# Patient Record
Sex: FEMALE | Race: WHITE | NOT HISPANIC OR LATINO | Employment: UNEMPLOYED | URBAN - METROPOLITAN AREA
[De-identification: names, ages, dates, MRNs, and addresses within clinical notes are randomized per-mention and may not be internally consistent; named-entity substitution may affect disease eponyms.]

---

## 2018-05-14 ENCOUNTER — OFFICE VISIT CONVERTED (OUTPATIENT)
Dept: ONCOLOGY | Facility: HOSPITAL | Age: 23
End: 2018-05-14
Attending: INTERNAL MEDICINE

## 2018-06-04 ENCOUNTER — OFFICE VISIT CONVERTED (OUTPATIENT)
Dept: ONCOLOGY | Facility: HOSPITAL | Age: 23
End: 2018-06-04
Attending: INTERNAL MEDICINE

## 2018-07-02 ENCOUNTER — OFFICE VISIT CONVERTED (OUTPATIENT)
Dept: ONCOLOGY | Facility: HOSPITAL | Age: 23
End: 2018-07-02
Attending: INTERNAL MEDICINE

## 2018-12-05 ENCOUNTER — OFFICE VISIT CONVERTED (OUTPATIENT)
Dept: SURGERY | Facility: CLINIC | Age: 23
End: 2018-12-05
Attending: NURSE PRACTITIONER

## 2019-01-07 ENCOUNTER — HOSPITAL ENCOUNTER (OUTPATIENT)
Dept: ONCOLOGY | Facility: HOSPITAL | Age: 24
Discharge: HOME | End: 2019-01-07
Attending: INTERNAL MEDICINE

## 2019-01-07 ENCOUNTER — OFFICE VISIT CONVERTED (OUTPATIENT)
Dept: ONCOLOGY | Facility: HOSPITAL | Age: 24
End: 2019-01-07
Attending: INTERNAL MEDICINE

## 2019-01-07 LAB
ALBUMIN SERPL-MCNC: 4.6 G/DL (ref 3.5–5)
ALBUMIN/GLOB SERPL: 1.6 {RATIO} (ref 1.4–2.6)
ALP SERPL-CCNC: 65 U/L (ref 42–98)
ALT SERPL-CCNC: 14 U/L (ref 10–40)
ANION GAP SERPL CALC-SCNC: 14 MMOL/L (ref 8–19)
AST SERPL-CCNC: 25 U/L (ref 15–50)
BASOPHILS # BLD AUTO: 0.01 10*3/UL (ref 0–0.2)
BASOPHILS NFR BLD AUTO: 0.1 % (ref 0–3)
BILIRUB SERPL-MCNC: 0.22 MG/DL (ref 0.2–1.3)
BUN SERPL-MCNC: 7 MG/DL (ref 5–25)
BUN/CREAT SERPL: 9 {RATIO} (ref 6–20)
CALCIUM SERPL-MCNC: 9.3 MG/DL (ref 8.7–10.4)
CHLORIDE SERPL-SCNC: 101 MMOL/L (ref 99–111)
CONV CO2: 25 MMOL/L (ref 22–32)
CONV TOTAL PROTEIN: 7.5 G/DL (ref 6.3–8.2)
CREAT UR-MCNC: 0.82 MG/DL (ref 0.5–0.9)
EOSINOPHIL # BLD AUTO: 0.05 10*3/UL (ref 0–0.7)
EOSINOPHIL # BLD AUTO: 1.07 % (ref 0–7)
ERYTHROCYTE [DISTWIDTH] IN BLOOD BY AUTOMATED COUNT: 12.4 % (ref 11.5–14.5)
GFR SERPLBLD BASED ON 1.73 SQ M-ARVRAT: >60 ML/MIN/{1.73_M2}
GLOBULIN UR ELPH-MCNC: 2.9 G/DL (ref 2–3.5)
GLUCOSE SERPL-MCNC: 127 MG/DL (ref 65–99)
HBA1C MFR BLD: 12.4 G/DL (ref 12–16)
HCT VFR BLD AUTO: 35.4 % (ref 37–47)
LYMPHOCYTES # BLD AUTO: 1.48 10*3/UL (ref 1–5)
MCH RBC QN AUTO: 28.8 PG (ref 27–31)
MCHC RBC AUTO-ENTMCNC: 35 G/DL (ref 33–37)
MCV RBC AUTO: 82.3 FL (ref 81–99)
MONOCYTES # BLD AUTO: 0.24 10*3/UL (ref 0.2–1.2)
MONOCYTES NFR BLD AUTO: 4.74 % (ref 3–10)
NEUTROPHILS # BLD AUTO: 3.31 10*3/UL (ref 2–8)
NEUTROPHILS NFR BLD AUTO: 65 % (ref 30–85)
NRBC BLD AUTO-RTO: 0 % (ref 0–0.01)
OSMOLALITY SERPL CALC.SUM OF ELEC: 284 MOSM/KG (ref 273–304)
PLATELET # BLD AUTO: 130 10*3/UL (ref 130–400)
PMV BLD AUTO: 9.4 FL (ref 7.4–10.4)
POTASSIUM SERPL-SCNC: 3.4 MMOL/L (ref 3.5–5.3)
RBC # BLD AUTO: 4.3 10*6/UL (ref 4.2–5.4)
SODIUM SERPL-SCNC: 137 MMOL/L (ref 135–147)
VARIANT LYMPHS NFR BLD MANUAL: 29.1 % (ref 20–45)
WBC # BLD AUTO: 5.09 10*3/UL (ref 4.8–10.8)

## 2019-01-29 ENCOUNTER — OFFICE VISIT CONVERTED (OUTPATIENT)
Dept: GASTROENTEROLOGY | Facility: CLINIC | Age: 24
End: 2019-01-29
Attending: NURSE PRACTITIONER

## 2019-01-31 ENCOUNTER — HOSPITAL ENCOUNTER (OUTPATIENT)
Dept: GASTROENTEROLOGY | Facility: HOSPITAL | Age: 24
Setting detail: HOSPITAL OUTPATIENT SURGERY
Discharge: HOME | End: 2019-01-31
Attending: INTERNAL MEDICINE

## 2019-01-31 LAB — HCG UR QL: NEGATIVE

## 2019-02-19 ENCOUNTER — OFFICE VISIT CONVERTED (OUTPATIENT)
Dept: GASTROENTEROLOGY | Facility: CLINIC | Age: 24
End: 2019-02-19
Attending: NURSE PRACTITIONER

## 2019-02-23 ENCOUNTER — HOSPITAL ENCOUNTER (OUTPATIENT)
Dept: MRI IMAGING | Facility: HOSPITAL | Age: 24
Discharge: HOME | End: 2019-02-23
Attending: NURSE PRACTITIONER

## 2019-02-27 ENCOUNTER — HOSPITAL ENCOUNTER (OUTPATIENT)
Dept: URGENT CARE | Facility: CLINIC | Age: 24
Discharge: HOME | End: 2019-02-27
Attending: FAMILY MEDICINE

## 2019-03-01 LAB — BACTERIA SPEC AEROBE CULT: NORMAL

## 2019-03-09 ENCOUNTER — HOSPITAL ENCOUNTER (OUTPATIENT)
Dept: OTHER | Facility: HOSPITAL | Age: 24
Discharge: HOME | End: 2019-03-09
Attending: INTERNAL MEDICINE

## 2019-03-09 LAB
CHOLEST SERPL-MCNC: 149 MG/DL (ref 107–200)
CHOLEST/HDLC SERPL: 2.9 {RATIO} (ref 3–6)
EST. AVERAGE GLUCOSE BLD GHB EST-MCNC: 163 MG/DL
FERRITIN SERPL-MCNC: 271 NG/ML (ref 10–200)
HBA1C MFR BLD: 7.3 % (ref 3.5–5.7)
HDLC SERPL-MCNC: 51 MG/DL (ref 40–60)
IRON SATN MFR SERPL: 17 % (ref 20–55)
IRON SERPL-MCNC: 61 UG/DL (ref 60–170)
LDLC SERPL CALC-MCNC: 74 MG/DL (ref 70–100)
TIBC SERPL-MCNC: 368 UG/DL (ref 245–450)
TRANSFERRIN SERPL-MCNC: 257 MG/DL (ref 250–380)
TRIGL SERPL-MCNC: 118 MG/DL (ref 40–150)
TSH SERPL-ACNC: 1.26 M[IU]/L (ref 0.27–4.2)
VLDLC SERPL-MCNC: 24 MG/DL (ref 5–37)

## 2019-03-12 LAB
CERULOPLASMIN SERPL-MCNC: 22.1 MG/DL (ref 19–39)
CONV ANTI NUCLEAR ANTIBODY WITH REFLEX: NEGATIVE
CONV HEPATITIS B SURFACE AG W CONFIRMATION RE: NEGATIVE
CONV IMMUNOGLOBULIN G (IGG): 1078 MG/DL (ref 700–1600)
CONV IMMUNOGLOBULIN M (IGM): 425 MG/DL (ref 26–217)
DEPRECATED MITOCHONDRIA M2 IGG SER-ACNC: <20 UNITS (ref 0–20)
HAV IGM SERPL QL IA: NEGATIVE
HBV CORE IGM SERPL QL IA: NEGATIVE
HCV AB SER DONR QL: <0.1 S/CO RATIO (ref 0–0.9)
IGA SERPL-MCNC: 352 MG/DL (ref 87–352)

## 2019-03-13 LAB — SMOOTH MUSCLE F-ACTIN AB IGG: 11 UNITS (ref 0–19)

## 2019-03-15 LAB
A1AT SERPL-MCNC: 139 MG/DL (ref 90–200)
PHENOTYPE: NORMAL

## 2019-05-01 ENCOUNTER — HOSPITAL ENCOUNTER (OUTPATIENT)
Dept: OTHER | Facility: HOSPITAL | Age: 24
Discharge: HOME | End: 2019-05-01

## 2019-05-03 LAB
CONV CREATININE (CRT) (URINE): 0.69 G/L (ref 0.3–3)
CONV TOTAL VOLUME (URINE): 1850 ML
COPPER 24H UR-MRATE: 15 UG/24 HR (ref 3–35)
COPPER UR-MCNC: 12 UG/G CREAT (ref 0–49)
COPPER UR-MCNC: 8 UG/L

## 2019-10-15 ENCOUNTER — OFFICE VISIT CONVERTED (OUTPATIENT)
Dept: GASTROENTEROLOGY | Facility: CLINIC | Age: 24
End: 2019-10-15
Attending: NURSE PRACTITIONER

## 2019-11-20 ENCOUNTER — HOSPITAL ENCOUNTER (OUTPATIENT)
Dept: URGENT CARE | Facility: CLINIC | Age: 24
Discharge: HOME | End: 2019-11-20
Attending: PHYSICIAN ASSISTANT

## 2020-02-27 ENCOUNTER — OFFICE VISIT CONVERTED (OUTPATIENT)
Dept: NEUROLOGY | Facility: CLINIC | Age: 25
End: 2020-02-27
Attending: NURSE PRACTITIONER

## 2020-09-18 ENCOUNTER — HOSPITAL ENCOUNTER (OUTPATIENT)
Dept: GENERAL RADIOLOGY | Facility: HOSPITAL | Age: 25
Discharge: HOME | End: 2020-09-18
Attending: NURSE PRACTITIONER

## 2020-10-15 LAB
CREAT BLD-MCNC: 0.8 MG/DL (ref 0.6–1.4)
GFR SERPLBLD BASED ON 1.73 SQ M-ARVRAT: >60 ML/MIN/{1.73_M2}

## 2020-12-01 ENCOUNTER — HOSPITAL ENCOUNTER (OUTPATIENT)
Dept: GENERAL RADIOLOGY | Facility: HOSPITAL | Age: 25
Discharge: HOME | End: 2020-12-01
Attending: OBSTETRICS & GYNECOLOGY

## 2021-01-18 ENCOUNTER — HOSPITAL ENCOUNTER (OUTPATIENT)
Dept: OTHER | Facility: HOSPITAL | Age: 26
Discharge: HOME | End: 2021-01-18
Attending: NURSE PRACTITIONER

## 2021-01-18 ENCOUNTER — OFFICE VISIT CONVERTED (OUTPATIENT)
Dept: GASTROENTEROLOGY | Facility: CLINIC | Age: 26
End: 2021-01-18
Attending: NURSE PRACTITIONER

## 2021-01-27 LAB
PBG 24H UR-MRATE: 0.4 MG/L (ref 0–2)
PBG 24H UR-MRATE: 0.6 MG/24 HR (ref 0–1.5)
URINE VOLUME: 1500 ML

## 2021-01-28 LAB
COPRO1 24H UR-MRATE: 11 UG/L
COPRO1 24H UR-MRATE: 17 UG/24 HR (ref 0–24)
COPRO3 24H UR-MRATE: 27 UG/L
COPRO3 24H UR-MRATE: 41 UG/24 HR (ref 0–74)
HEPTA-CP 24H UR-SRATE: 2 UG/L
HEPTA-CP 24H UR-SRATE: 3 UG/24 HR (ref 0–4)
HEXA-CP 24H UR-SRATE: <1 UG/L
HEXACARBOXYLPORPHYRIN [PRESENCE] IN 24 HOUR URINE: <2 UG/24 HR (ref 0–1)
Lab: <1 UG/L
PENTA-CP 24H UR-SRATE: <2 UG/24 HR (ref 0–4)
TOTAL VOLUME: 1500 ML
UROPOR 24H UR QL: 20 UG/24 HR (ref 0–24)
UROPOR UR-MCNC: 13 UG/L

## 2021-01-29 ENCOUNTER — HOSPITAL ENCOUNTER (OUTPATIENT)
Dept: OTHER | Facility: HOSPITAL | Age: 26
Discharge: HOME | End: 2021-01-29
Attending: NURSE PRACTITIONER

## 2021-02-03 LAB
D-ALA 24H UR-MRATE: 1.3 MG/L
Lab: 2 MG/24 HR (ref 0.5–5.1)
URINE VOLUME: 1500 ML

## 2021-03-18 ENCOUNTER — TELEMEDICINE CONVERTED (OUTPATIENT)
Dept: GASTROENTEROLOGY | Facility: CLINIC | Age: 26
End: 2021-03-18
Attending: NURSE PRACTITIONER

## 2021-05-14 VITALS
DIASTOLIC BLOOD PRESSURE: 75 MMHG | HEART RATE: 76 BPM | HEIGHT: 62 IN | WEIGHT: 157.12 LBS | BODY MASS INDEX: 28.91 KG/M2 | TEMPERATURE: 96.7 F | SYSTOLIC BLOOD PRESSURE: 110 MMHG

## 2021-05-14 VITALS — WEIGHT: 150 LBS

## 2021-05-14 NOTE — PROGRESS NOTES
Progress Note      Patient Name: Yamilet Dempsey   Patient ID: [id number]   Sex: Female   YOB: 1995    Primary Care Provider: Katiuska Cross PA-C   Referring Provider: Feroz Ashley MD    Visit Date: 2021    Provider: KARI Ly   Location: [facility]   Location Address: [address] [place] [address], [address] [place] [address]  [id number]   Location Phone: [phone]          Chief Complaint  · Follow up Abdominal pain       History Of Present Illness  Video Conferencing Visit  Yamilet Dempsey is a 26 year old /White female who is presenting for evaluation via video conferencing via zoom. Verbal consent obtained before beginning visit.   The following staff were present during this visit: Migdalia Zheng MA      Ms. Dempsey presents for follow-up of abdominal pain and hepatosplenomegaly.  2021 urine porphobiinogen - normal. Urine porphyrins - normal.      She is continuing to experience abdominal discomfort that she describes as a fullness in her abdomen.  She feels that this is due to the increased size of her liver.      Reports that her other sister is also having similar issues.  They are all concerned since their mother had liver cancer.  She has been evaluated by hematology and hepatology and didn't feel that either were able to provider her with any answers.                  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; ; Tobacco (Never)          Review of Systems  · Constitutional  o Denies  o : chills, fever  · Cardiovascular  o Denies  o : chest pain, irregular heart beats  · Respiratory  o Denies  o : cough, shortness of breath  · Gastrointestinal  o Admits  o : see HPI   · Endocrine  o Denies  o : weight gain, weight loss      Vitals  Date Time BP Position Site L\R Cuff Size HR RR TEMP (F) WT  HT  BMI kg/m2 BSA m2 O2 Sat FR L/min FiO2 HC       03/18/2021 09:02 AM         150lbs 0oz                Physical Examination  · Constitutional  o Appearance  o : Healthy-appearing, awake and alert in no acute distress  · Head and Face  o Head  o : Normocephalic with no worriesome skin lesions  · Eyes  o Vision  o :   § Visual Fields  § : eyes move symmetrical in all directions  o Sclerae  o : sclerae anicteric  o Pupils and Irises  o : pupils equal and symmetrical  · Neck  o Inspection/Palpation  o : normal appearance, trachea midline  · Respiratory  o Respiratory Effort  o : Breathing is unlabored.  o Inspection of Chest  o : normal appearance  · Skin and Subcutaneous Tissue  o General Inspection  o : no lesions present, no areas of discoloration  · Psychiatric  o General  o : Alert and oriented x3  o Mood and Affect  o : Mood and affect are appropriate to circumstances          Assessment  · Abdominal Pain, Generalized     789.07/R10.84  · Hepatosplenomegaly     571.8/R16.2      Plan  · Medications  o Medications have been Reconciled  · Instructions  o Information given on current diagnoses. Discussed with patient that all workup has been unrevealing from a GI standpoint. Would recommend further w/u at higher level of care such as [facility].  · Disposition  o Follow up PRN - Call if any change in bowel pattern, abdominal pain, rectal bleeding, or any new GI complaint.            Electronically Signed by: Gabriela Stiles APRN -Author on March 19, 2021 02:21:07 PM

## 2021-05-14 NOTE — PROGRESS NOTES
Progress Note      Patient Name: Yamilet Dempsey   Patient ID: [id number]   Sex: Female   YOB: 1995    Primary Care Provider: Katiuska Cross PA-C   Referring Provider: Feroz Ashley MD    Visit Date: 2021    Provider: KARI Ly   Location: [facility]   Location Address: [address] [place] [address], [address] [place] [address]  [id number]   Location Phone: [phone]          Chief Complaint  · Follow up Abdominal pain       History Of Present Illness     Ms. Dempsey presents for follow-up of right upper quadrant abdominal pain, thrombocytopenia and hepatosplenomegaly.    She was last evaluated in 10/2019 and had been referred to both hematology and hepatology.  She underwent liver biopsy at  and per patient, it was unrevealing.  Hematology w/u was also normal.      She reports that pain has worsened over the last few weeks.  Reports that pain is still present and is right under ribs on both left and right side.  Reports that pain is worse after meals, especially if she drinks carbonated drinks.  Hasn't noticed any correlation with high fat foods.  Takes Tylenol some, but it's not very helpful.      Reports alternating between constipation and diarrhea.  Denies rectal bleeding.      2020 CT abdomen pelvis with contrast-hepatosplenomegaly and small involuting enhancing cyst of the left ovary.       Past Medical History  Diabetes; Diabetes mellitus, insulin dependent (IDDM), controlled; Gastric Ulcer         Past Surgical History   section; Endoscopy; Liver biopsy         Medication List  Humalog U-100 Insulin 100 unit/mL subcutaneous cartridge; Prenatal oral         Allergy List  NO KNOWN DRUG ALLERGIES       Allergies Reconciled  Family Medical History  Diabetes, unspecified type; Family history of breast cancer; Family history of cancer         Social History  Alcohol (Never); Caffeine (Current - status unknown); Homemaker; lives  "with children; ; Tobacco (Never)         Review of Systems  · Constitutional  o Denies  o : chills, fever  · Cardiovascular  o Denies  o : chest pain, irregular heart beats  · Respiratory  o Denies  o : cough, shortness of breath  · Gastrointestinal  o Admits  o : see HPI   · Endocrine  o Denies  o : weight gain, weight loss      Vitals  Date Time BP Position Site L\R Cuff Size HR RR TEMP (F) WT  HT  BMI kg/m2 BSA m2 O2 Sat FR L/min FiO2 HC       01/18/2021 09:29 /75 Sitting    76 - R  96.7 157lbs 2oz 5'  2\" 28.74 1.77             Physical Examination  · Constitutional  o Appearance  o : Healthy-appearing, awake and alert in no acute distress  · Head and Face  o Head  o : Normocephalic with no worriesome skin lesions  · Eyes  o Vision  o :   § Visual Fields  § : eyes move symmetrical in all directions  o Sclerae  o : sclerae anicteric  o Pupils and Irises  o : pupils equal and symmetrical  · Neck  o Inspection/Palpation  o : Trachea is midline, no adenopathy  · Respiratory  o Respiratory Effort  o : Breathing is unlabored.  o Inspection of Chest  o : normal appearance  o Auscultation of Lungs  o : Chest is clear to auscultation bilaterally.  · Cardiovascular  o Heart  o :   § Auscultation of Heart  § : no murmurs, rubs, or gallops  o Peripheral Vascular System  o :   § Extremities  § : no cyanosis, clubbing or edema;   · Gastrointestinal  o Abdominal Examination  o : mild right-upper quadrant left-upper quadrant tenderness to palpation present, normal bowel sounds, tone normal without rigidity or guarding, no masses present, abdomen scaphoid upon supine, no ascites  o Digital Rectal Exam  o : deferred  · Skin and Subcutaneous Tissue  o General Inspection  o : without focal lesions; turgor is normal  · Psychiatric  o General  o : Alert and oriented x3  o Mood and Affect  o : Mood and affect are appropriate to circumstances  · Extremities  o Extremities  o : No edema, no " cyanosis          Assessment  · Abdominal Pain, RUQ     789.01/R10.11  · Abdominal Pain, LUQ     789.02/R10.12  · Hepatosplenomegaly     571.8/R16.2      Plan  · Orders  o Porphobilinogen ur (32396) - - 01/18/2021  o Urine delta aminolevulinic acid/creatinine molar ratio (36453, 16418) - - 01/18/2021  o 24 hour urine porphyrins detection (51116, 73259) - - 01/18/2021  · Medications  o dicyclomine 10 mg oral capsule   SIG: take 1 capsule by oral route 4 times a day PRN abdominal spasms/pain   DISP: (120) Capsule with 1 refills  Prescribed on 01/18/2021     o Medications have been Reconciled  o Transition of Care or Provider Policy  · Instructions  o Information given on current diagnoses. Labs as above to rule out acute hepatic porphyria.  · Disposition  o Follow up 3 months  o Follow up 2 months            Electronically Signed by: KARI Ly -Author on January 18, 2021 01:06:52 PM

## 2021-05-15 VITALS
WEIGHT: 137.25 LBS | SYSTOLIC BLOOD PRESSURE: 136 MMHG | HEIGHT: 63 IN | BODY MASS INDEX: 24.32 KG/M2 | DIASTOLIC BLOOD PRESSURE: 69 MMHG

## 2021-05-15 VITALS
DIASTOLIC BLOOD PRESSURE: 77 MMHG | WEIGHT: 133 LBS | BODY MASS INDEX: 24.48 KG/M2 | HEIGHT: 62 IN | SYSTOLIC BLOOD PRESSURE: 127 MMHG

## 2021-05-15 VITALS
BODY MASS INDEX: 25.42 KG/M2 | DIASTOLIC BLOOD PRESSURE: 63 MMHG | HEIGHT: 62 IN | WEIGHT: 138.12 LBS | SYSTOLIC BLOOD PRESSURE: 125 MMHG

## 2021-05-15 VITALS — RESPIRATION RATE: 12 BRPM | WEIGHT: 136.25 LBS | HEIGHT: 63 IN | BODY MASS INDEX: 24.14 KG/M2

## 2021-05-15 VITALS
SYSTOLIC BLOOD PRESSURE: 112 MMHG | WEIGHT: 141.19 LBS | HEART RATE: 96 BPM | DIASTOLIC BLOOD PRESSURE: 74 MMHG | HEIGHT: 63 IN | BODY MASS INDEX: 25.02 KG/M2

## 2021-05-28 VITALS
HEART RATE: 80 BPM | DIASTOLIC BLOOD PRESSURE: 103 MMHG | OXYGEN SATURATION: 95 % | RESPIRATION RATE: 16 BRPM | TEMPERATURE: 98.2 F | WEIGHT: 132.28 LBS | HEIGHT: 64 IN | SYSTOLIC BLOOD PRESSURE: 145 MMHG | BODY MASS INDEX: 22.58 KG/M2

## 2021-05-28 VITALS
SYSTOLIC BLOOD PRESSURE: 129 MMHG | HEIGHT: 64 IN | HEART RATE: 72 BPM | HEIGHT: 64 IN | HEART RATE: 75 BPM | BODY MASS INDEX: 22.39 KG/M2 | WEIGHT: 131.17 LBS | RESPIRATION RATE: 16 BRPM | SYSTOLIC BLOOD PRESSURE: 102 MMHG | HEART RATE: 81 BPM | SYSTOLIC BLOOD PRESSURE: 110 MMHG | BODY MASS INDEX: 21.72 KG/M2 | WEIGHT: 128.31 LBS | OXYGEN SATURATION: 100 % | BODY MASS INDEX: 21.91 KG/M2 | TEMPERATURE: 97.9 F | DIASTOLIC BLOOD PRESSURE: 64 MMHG | TEMPERATURE: 97.6 F | TEMPERATURE: 97.9 F | HEIGHT: 64 IN | DIASTOLIC BLOOD PRESSURE: 60 MMHG | OXYGEN SATURATION: 100 % | OXYGEN SATURATION: 100 % | DIASTOLIC BLOOD PRESSURE: 56 MMHG | WEIGHT: 127.21 LBS | RESPIRATION RATE: 16 BRPM

## 2021-05-28 NOTE — PROGRESS NOTES
Patient: TK NUNEZ     Acct: [id number]     Report: #[id number]  UNIT #: [id number]     : 1995    Encounter Date:2018  PRIMARY CARE: MEDHAT MURILLO  ***Signed***  --------------------------------------------------------------------------------------------------------------------  NURSE INTAKE      Encounter Date      2018            Providers      Referring Provider:  MEDHAT MURILLO      Primary Provider:  MEDHAT MURILLO            Visit Type      Established Patient Visit            Chief Complaint      SPLENOMEGALY            Allergies      Coded Allergies:             NO KNOWN DRUG ALLERGIES (Verified  Allergy, Unknown, 18)            Medications            Albuterol (Proair HFA*) 8.5 Gm Inh      1 PUFFS INH RTQ4H, #1 INH 0 Refills         Prov: COLLETTE BUCHANAN cfr         17       Azithromycin Z-Tom (Zithromax Z-Tom) 250 Mg Tablet      250 MG PO ASDIR, #1 PKT         Prov: COLLETTE BUCHANAN cfr         17       Benzonatate (Tessalon Perles) 100 Mg Cap      100 MG PO TID, #15 CAP         Prov: COLLETTE BUCHANAN cfr         17       Insulin Lispro (HumaLOG VIAL*) Unknown Strength Vial      SUBQ BID INSULIN, #1 VIAL 0 Refills         Reported         17      Medications Reviewed:  No Changes made to meds            PAST, FAMILY   Past Medical History      Past Medical History:  Yes Diabetes Type 1      Hematology/oncology:  REPORTS HX OF: Breast cancer (MATERNAL GRANDMOTHER, AUNT     X 2), Cervical cancer (SISTER), Colorectal cancer (PATERNAL GRANDMOTHER), Liver     cancer (MOTHER), Lung cancer (PATERNAL UNCLE), Thyroid cancer (SISTER)            Past Surgical History      REPORTS HX OF: Other Past Surgical Hx (C SECTION )            Social History      Lives independently:  Yes            Tobacco Use      Tobacco status:  Never smoker      Counseling given:  Patient declined            Alcohol Use      Alcohol intake:  None            Substance Use      Substance use:   Denies use            HISTORY OF PRESENT ILLNESS      Interim History            Ms. Dempsey is a very pleasant 23-year-old lady who has a recent history of     infectious mononucleosis, at that time she was having some subtle cervical     lymphadenopathy which has now resolved. Patient denies any fever or chills at     this point.       she has not had any significant weight loss. But she continues to complain of     mild abdominal tenderness. CT scan of the abdomen and pelvis showed     hepatosplenomegaly in February and now follow-up ultrasound shows splenomegaly     up to 16 cm in length and borderline hepatomegaly.      She has completd the workup for any underlying lymphoproliferative disorder     with flow cytometry of the peripheral blood. We also  checked for hepatitis     profile.      Patient continues to complain of left-sided abdominal pain and has previous     history of ulcers because of which she is currently taking Zantac. Patient is     known to be diabetic and recently her glucose was as elevated as for 450     recently.       She is concerned about the possibility of an pancreatitis even though she has     no previous history of such.            Most Recent Lab Findings      Laboratory Tests      5/14/18 14:06            REVIEW OF SYSTEMS      General:  Complains of: Fatigue, Denies: Appetite change, Excessive sweating,     Fever, Night sweats, Weight gain, Weight loss, Other      Eyes:  Denies: Blurred vision, Corrective lenses, Diplopia, Eye irritation, Eye     pain, Eye redness, Spots in vision, Vision loss, Other      Ears, nose, mouth, throat:  Denies: Headache, Seizures, Visual Changes, Hearing     loss, Sinus Congestion, Hoarseness, Sore throat, Other      Cardiovascular:  Denies: Chest pain, Irregular heartbeat, Palpitations, Swollen     ankles/legs, Other      Respiratory:  Denies: Chest pain, Shortness of Air, Productive cough, Coughing     blood, Other      Gastrointestinal:  Denies:  Nausea, Vomiting, Problem swallowing, Frequent     heartburn, Constipation, Diarrhea, Tarry stools, Bloody stools, Unable to     control bowels, Other      Kidney/Bladder:  Denies: Painful Urination, Change in urinary stream, Blood in     urine, Incontinence, Frequent Urination, Decreased urine stream, Other      Musculoskeletal:  Denies: New Back pain, Leg Cramps, Painful Joints, Swollen     Joints, Muscle Pain, Muscle weakness, Other      Skin:  DENIES: Jaundice, Easy Bleeding, Lesions/changes in moles, Nail changes,     Skin Discoloration, Rash, Other      Neurological:  Denies: Dizziness, Fainting, Numbness\Tingling, Paralysis,     Seizures, Other      Psychiatric:  Complains of: AAO X 3, Denies: Anxiety, Panic attacks, Depression    , Memory loss, Other      Endocrine:  DiabetesThyroid DisorderOsteoporosisEndocrine Other      Hematologic/lymphatic:  Denies: Bruising, Bleeding, Enlarged Lymph Nodes,     Recurrent infections, Other      Reproductive:  Denies Pregnant, Denies Menopause, Denies Still Menstruating,     Denies Heavy Periods, Denies Other            VITAL SIGNS AND SCORES      Vitals      Height 5 ft 3.58 in / 161.5 cm      Weight 128 lbs 4.924 oz / 58.2 kg      BSA 1.62 m2      BMI 22.3 kg/m2      Temperature 97.6 F / 36.44 C - Temporal      Pulse 81      Respirations 16      Blood Pressure 110/56 Sitting, Right Arm      Pulse Oximetry 100%, RM AIR            Pain Score      Pain Assessment:           Experiencing any pain?:  Yes         Pain Scale Used:  Numerical         Pain Intensity:  7 (ABDOMEN)            Fatigue Score               Experiencing any fatigue?:  Yes         Fatigue (0-10 scale):  7            EXAM      Vitals            Vital Signs              Date Time  Temp Pulse Resp B/P (MAP) Pulse Ox O2 Delivery O2 Flow Rate FiO2             5/14/18 13:14 97.9 75 16 129/64 100 RM AIR              General Appearance:  Alert, Oriented X3, Cooperative      Eyes:  Anicteric Sclerae, Moist  Conjunctiva      HEENT:  Orophraynx clear, No Erythema, No Exudates      Neck:  Supple, Full ROM      Respiratory:  CTAB, No Diminished Breath      Abdomen\Gastro:  Other (tenderness)      Cardio:  RRR, No Murmur, No, Peripheral Edema      Skin:  Normal Temperature, No Induration      Psychiatric:  Appropriate Affect, Intact Judgement, AAO x3      Muscularskeletal:  Normal Gait and Station, Full ROM of extremeties      Lymphatic:  No Cervical, No Supraclavicular, No Infraclavicular, No Axillary,     No Inguinal, No Other            PREVENTION      Hx Influenza Vaccination:  No      2 or More Falls Past Year?:  No      Fall Past Year with Injury?:  No      Hx Pneumococcal Vaccination:  No      Encouraged to follow-up with:  PCP regarding preventative exams.      Chart initiated by      ANTONIO CORLEY MA            IMPRESSION/PLAN      Current Plan            Patient has previous history of hepatosplenomegaly which was in the setting of     infectious mononucleosis back in February 2018. Flow cytometry on peripheral     blood is negative so is hepatitis profile. Patient continues to complain of left    -sided abdominal pain as well as hyperglycemia. Our plan at this time is to     repeat CT scan of the abdomen and pelvis as well as amylase and lipase to rule     out the possibility of pancreatitis and to follow-up on hepatosplenomegaly. In     the meantime we are going to add Nexium to Zantac for possible symptomatic     relief.            Plan      Abdominal pain - R10.9            Notes      New Diagnostics      * Abd/Pel W/ Cont CT, SCHEDULED PROCEDURE       Dx: Abdominal pain - R10.9      * Amylase    Dx: Abdominal pain - R10.9            Patient Education      Patient Education Provided:  Yes                 Disclaimer: Converted document may not contain table formatting or lab diagrams. Please see [employer]aves for the authenticated document.

## 2021-05-28 NOTE — PROGRESS NOTES
Patient: TK NUNEZ     Acct: [id number]     Report: #[id number]  UNIT #: [id number]     : 1995    Encounter Date:2018  PRIMARY CARE: MEDHAT MURILLO  ***Signed***  --------------------------------------------------------------------------------------------------------------------  NURSE INTAKE      Encounter Date      2018            Providers      Referring Provider:  MEDHAT MURILLO      Primary Provider:  MEDHAT MURILLO            Visit Type      Established Patient Visit            Chief Complaint      SPLENOMEGALY            Allergies      Coded Allergies:             NO KNOWN DRUG ALLERGIES (Verified  Allergy, Unknown, 18)            Medications      Last Reconciled on 18 13:10 by GAYATHRI GRANDA MD      Esomeprazole Mag (NexIUM) 20 Mg Capsule.      20 MG PO BIDAC, #60 CAP 2 Refills         Prov: DAVID GRANDA         18       Albuterol (Proair HFA*) 8.5 Gm Inh      1 PUFFS INH RTQ4H, #1 INH 0 Refills         Prov: COLLETTE BUCHANAN cfr         17       Azithromycin Z-Tom (Zithromax Z-Tom) 250 Mg Tablet      250 MG PO ASDIR, #1 PKT         Prov: COLLETTE BUCHANAN cfr         17       Benzonatate (Tessalon Perles) 100 Mg Cap      100 MG PO TID, #15 CAP         Prov: COLLETTE BUCHANAN cfr         17       Insulin Lispro (HumaLOG VIAL*) Unknown Strength Vial      SUBQ BID INSULIN, #1 VIAL 0 Refills         Reported         17      Medications Reviewed:  No Changes made to meds            PAST, FAMILY   Past Medical History      Past Medical History:  Yes Diabetes Type 1      Hematology/oncology:  REPORTS HX OF: Breast cancer (MATERNAL GRANDMOTHER, AUNT     X 2), Cervical cancer (SISTER), Colorectal cancer (PATERNAL GRANDMOTHER), Liver     cancer (MOTHER), Lung cancer (PATERNAL UNCLE), Thyroid cancer (SISTER)            Past Surgical History      REPORTS HX OF: Other Past Surgical Hx (C SECTION 2015)            Social History      Lives independently:  Yes             Tobacco Use      Tobacco status:  Never smoker      Counseling given:  Patient declined            Alcohol Use      Alcohol intake:  None            Substance Use      Substance use:  Denies use            HISTORY OF PRESENT ILLNESS      Interim History      Patient is here for follow up for history of hepatosplenomegaly.  She continues     to complain of fatigue, abdominal pain, and some dizzy spells.            History and Physical      Ms. Dempsey is a very pleasant 23-year-old lady who has a recent history of     infectious mononucleosis, at that time she was having some subtle cervical     lymphadenopathy which has now resolved. Patient denies any fever or chills at     this point.       she has not had any significant weight loss. But she continues to complain of     mild abdominal tenderness. CT scan of the abdomen and pelvis showed     hepatosplenomegaly in February and now follow-up ultrasound shows splenomegaly     up to 16 cm in length and borderline hepatomegaly.      She has completd the workup for any underlying lymphoproliferative disorder     with flow cytometry of the peripheral blood. We also  checked for hepatitis     profile.      Patient continues to complain of left-sided abdominal pain and has previous     history of ulcers because of which she is currently taking Zantac. Patient is     known to be diabetic and recently her glucose was as elevated as for 450     recently.       She is concerned about the possibility of an pancreatitis even though she has     no previous history of such.            REVIEW OF SYSTEMS      General:  Denies: Appetite change, Excessive sweating, Fatigue, Fever, Night     sweats, Weight gain, Weight loss, Other      Eyes:  Denies: Blurred vision, Corrective lenses, Diplopia, Eye irritation, Eye     pain, Eye redness, Spots in vision, Vision loss, Other      Ears, nose, mouth, throat:  Denies: Headache, Seizures, Visual Changes, Hearing     loss, Sinus Congestion,  Hoarseness, Sore throat, Other      Cardiovascular:  Denies: Chest pain, Irregular heartbeat, Palpitations, Swollen     ankles/legs, Other      Respiratory:  Denies: Chest pain, Shortness of Air, Productive cough, Coughing     blood, Other      Gastrointestinal:  Denies: Nausea, Vomiting, Problem swallowing, Frequent     heartburn, Constipation, Diarrhea, Tarry stools, Bloody stools, Unable to     control bowels, Other      Kidney/Bladder:  Denies: Painful Urination, Change in urinary stream, Blood in     urine, Incontinence, Frequent Urination, Decreased urine stream, Other      Musculoskeletal:  Denies: New Back pain, Leg Cramps, Painful Joints, Swollen     Joints, Muscle Pain, Muscle weakness, Other      Skin:  DENIES: Jaundice, Easy Bleeding, Lesions/changes in moles, Nail changes,     Skin Discoloration, Rash, Other      Neurological:  Complains of: Dizziness (RANDOM DIZZY SPELLS), Denies: Fainting,     Numbness\Tingling, Paralysis, Seizures, Other      Psychiatric:  Complains of: AAO X 3, Denies: Anxiety, Panic attacks, Depression    , Memory loss, Other      Endocrine:  Denies Diabetes (TYPE 1)Thyroid DisorderOsteoporosisEndocrine Other      Hematologic/lymphatic:  Denies: Bruising, Bleeding, Enlarged Lymph Nodes,     Recurrent infections, Other      Reproductive:  Denies Pregnant, Denies Menopause, Denies Still Menstruating,     Denies Heavy Periods, Denies Other            VITAL SIGNS AND SCORES      Vitals      Height 5 ft 3.58 in / 161.5 cm      Weight 131 lbs 2.779 oz / 59.5 kg      BSA 1.64 m2      BMI 22.8 kg/m2      Temperature 97.9 F / 36.61 C - Temporal      Pulse 72      Blood Pressure 102/60 Sitting, Left Arm      Pulse Oximetry 100%, ROOM AIR            Pain Score      Pain Assessment:           Experiencing any pain?:  Yes         Pain Scale Used:  Numerical         Pain Intensity:  5            Fatigue Score               Experiencing any fatigue?:  Yes         Fatigue (0-10 scale):  5             EXAM      Vitals            Vital Signs              Date Time  Temp Pulse Resp B/P (MAP) Pulse Ox O2 Delivery O2 Flow Rate FiO2             6/4/18 09:44 97.6 81 16 110/56 100 RM AIR              General Appearance:  Alert, Oriented X3, No acute distress      Eyes:  Anicteric Sclerae, Moist Conjunctiva      HEENT:  Orophraynx clear, No Erythema, No Exudates      Neck:  Supple, Full ROM      Respiratory:  CTAB, No Diminished Breath      Abdomen\Gastro:  Soft      Cardio:  RRR, No Murmur, No, Peripheral Edema      Skin:  Normal Temperature, No Induration      Psychiatric:  Appropriate Affect, Intact Judgement, AAO x3      Muscularskeletal:  Normal Gait and Station      Lymphatic:  No Cervical, No Supraclavicular, No Infraclavicular, No Axillary,     No Inguinal, No Other            PREVENTION      Hx Influenza Vaccination:  No      2 or More Falls Past Year?:  No      Fall Past Year with Injury?:  No      Hx Pneumococcal Vaccination:  No      Encouraged to follow-up with:  PCP regarding preventative exams.      Chart initiated by      PRECIOUS MOREAU CMA            IMPRESSION/PLAN      Current Plan            Patient has previous history of hepatosplenomegaly which was in the setting of     infectious mononucleosis back in February 2018. Flow cytometry on peripheral     blood is negative so is hepatitis profile. Patient continues to complain of left    -sided abdominal pain as well as hyperglycemia. Our plan at this time is to     repeat CT scan of the abdomen and pelvis as well as amylase and lipase to rule     out the possibility of pancreatitis and to follow-up on hepatosplenomegaly. In     the meantime we are going to add Nexium to Zantac for possible symptomatic     relief.            Current Plan 7/2/18:      Patient continues to complain of fatigue and abdominal tenderness.  She is on     an insulin pump and reports her blood glucose levels maintained in the 200-300     range.  Her diabetes is managed by her  PCP.  We reviewed her CT Abdomen which     shows improved hepatosplenomegaly as well as her Amylase 39 and lipase 23 from 6 /6/18.  These results discussed with patient.  We will see her again in 6     months and recheck her labs at that time.  Patient voices understanding and is     in agreement with plan.            Patient Education      Patient Education Provided:  Yes                 Disclaimer: Converted document may not contain table formatting or lab diagrams. Please see [employer] for the authenticated document.

## 2021-05-28 NOTE — PROGRESS NOTES
Patient: TK NUNEZ     Acct: [id number]     Report: #[id number]  UNIT #: [id number]     : 1995    Encounter Date:2018  PRIMARY CARE: MEDHAT MURILLO  ***Signed***  --------------------------------------------------------------------------------------------------------------------  NURSE INTAKE      Encounter Date      May 14, 2018            Providers      Referring Provider:  MEDHAT MURILLO      Primary Provider:  MEDHAT MURILLO            Visit Type      New Patient Visit            Chief Complaint      SPLENOMEGALY            Allergies      Coded Allergies:             NO KNOWN DRUG ALLERGIES (Verified  Allergy, Unknown, 18)            Medications            Albuterol (Proair HFA*) 8.5 Gm Inh      1 PUFFS INH RTQ4H, #1 INH 0 Refills         Prov: COLLETTE BUCHANAN cfr         17       Azithromycin Z-Tom (Zithromax Z-Tom) 250 Mg Tablet      250 MG PO ASDIR, #1 PKT         Prov: COLLETTE BUCHANAN cfr         17       Benzonatate (Tessalon Perles) 100 Mg Cap      100 MG PO TID, #15 CAP         Prov: COLLETTE BUCHANAN cfr         17       Insulin Lispro (HumaLOG VIAL*) Unknown Strength Vial      SUBQ BID INSULIN, #1 VIAL 0 Refills         Reported         17            PAST, FAMILY   Past Medical History      Past Medical History:  Yes Diabetes Type 1      Hematology/oncology:  REPORTS HX OF: Breast cancer (MATERNAL GRANDMOTHER, AUNT     X 2), Cervical cancer (SISTER), Colorectal cancer (PATERNAL GRANDMOTHER), Liver     cancer (MOTHER), Lung cancer (PATERNAL UNCLE), Thyroid cancer (SISTER)            Past Surgical History      REPORTS HX OF: Other Past Surgical Hx (C SECTION )            Social History      Lives independently:  Yes            Tobacco Use      Tobacco status:  Never smoker      Counseling given:  Patient declined            Alcohol Use      Alcohol intake:  None            Substance Use      Substance use:  Denies use            HISTORY OF PRESENT ILLNESS      History  and Physical            Mr. Dempsey is a very pleasant 23-year-old lady with type I diabetes and recent     infectious mononucleosis. Patient has had extensive workup which has included     an ultrasound examination of the abdomen because of abdominal tenderness and     she was found to have evidence of hepatosplenomegaly because of which she is     being referred to hematology. Her ultrasound examination on 2018 shows a     spleen enlargement up to 16 cm there was also evidence of enlargement of the     liver on CT scan of the abdomen patient has significant family history of     malignancies her mother  at a young age from liver cancer. Her most recent     bloodwork shows a white count of 4.6, hemoglobin 11.3 and platelet count 1:30     2K. CT scan of the abdomen and pelvis which was done on 02/10/2008 showed     enlargement of the right lobe of the liver up to 22.9 cm. Patient has no     previous history of jaundice or hepatitis. Patient also denies any family     history of blood disorder or lymphoma.            Most Recent Lab Findings      Laboratory Tests      18 21:22            REVIEW OF SYSTEMS      General:  Complains of: Fatigue, Denies: Appetite change, Excessive sweating,     Fever, Night sweats, Weight gain, Weight loss, Other      Eyes:  Denies: Blurred vision, Corrective lenses, Diplopia, Eye irritation, Eye     pain, Eye redness, Spots in vision, Vision loss, Other      Ears, nose, mouth, throat:  Denies: Headache, Seizures, Visual Changes, Hearing     loss, Sinus Congestion, Hoarseness, Sore throat, Other      Cardiovascular:  Denies: Chest pain, Irregular heartbeat, Palpitations, Swollen     ankles/legs, Other      Respiratory:  Denies: Chest pain, Shortness of Air, Productive cough, Coughing     blood, Other      Gastrointestinal:  Denies: Nausea, Vomiting, Problem swallowing, Frequent     heartburn, Constipation, Diarrhea, Tarry stools, Bloody stools, Unable to     control bowels,  Other      Kidney/Bladder:  Denies: Painful Urination, Change in urinary stream, Blood in     urine, Incontinence, Frequent Urination, Decreased urine stream, Other      Musculoskeletal:  Denies: New Back pain, Leg Cramps, Painful Joints, Swollen     Joints, Muscle Pain, Muscle weakness, Other      Skin:  DENIES: Jaundice, Easy Bleeding, Lesions/changes in moles, Nail changes,     Skin Discoloration, Rash, Other      Neurological:  Complains of: Numbness\Tingling, Denies: Dizziness, Fainting,     Paralysis, Seizures, Other      Psychiatric:  Complains of: AAO X 3, Denies: Anxiety, Panic attacks, Depression    , Memory loss, Other      Endocrine:  DiabetesThyroid DisorderOsteoporosisEndocrine Other      Hematologic/lymphatic:  Denies: Bruising, Bleeding, Enlarged Lymph Nodes,     Recurrent infections, Other      Reproductive:  Denies Pregnant, Denies Menopause, Denies Still Menstruating,     Denies Heavy Periods, Denies Other            VITAL SIGNS AND SCORES      Vitals      Height 5 ft 3.58 in / 161.5 cm      Weight 127 lbs 3.287 oz / 57.7 kg      BSA 1.61 m2      BMI 22.1 kg/m2      Temperature 97.9 F / 36.61 C - Temporal      Pulse 75      Respirations 16      Blood Pressure 129/64 Sitting, Right Arm      Pulse Oximetry 100%, RM AIR            Pain Score      Pain Assessment:           Experiencing any pain?:  No         Pain Scale Used:  Numerical         Pain Intensity:  0            Fatigue Score               Experiencing any fatigue?:  No         Fatigue (0-10 scale):  6            EXAM      Abdomen\Gastro:  Soft, No NABS, Masses, Hernias, Hemmorrhoids,     Hepatosplenomegaly, Other (tenderness)            PREVENTION      Hx Influenza Vaccination:  No      2 or More Falls Past Year?:  No      Fall Past Year with Injury?:  No      Hx Pneumococcal Vaccination:  No      Encouraged to follow-up with:  PCP regarding preventative exams.      Chart initiated by      ANTONIO CORLEY MA            IMPRESSION/PLAN       Current Plan            Ms. Dempsey is a very pleasant 23-year-old lady who has a recent history of     infectious mononucleosis at that time she was having some subtle Vicryl     lymphadenopathy which has now resolved. Patient denies any fever or chills at     this point she has not had any significant weight loss. But she continues to     complain of mild abdominal tenderness. CT scan of the abdomen and pelvis showed     hepatosplenomegaly in February and now follow-up ultrasound shows splenomegaly     up to 16 cm in length and borderline hepatomegaly. Our plan at this time is to     complete the workup for any underlying lymphoproliferative disorder with flow     cytometry of the peripheral blood. We will also be checking for hepatitis     profile patient will return after the studies have been completed. If there is     no clear etiology then we will recommend a follow-up scan in few months to make     sure there is complete resolution of hepatosplenomegaly.            Plan      Hepatomegaly - R16.0            Notes      New Diagnostics      * CBC, As Soon As Possible      * CMP Comp Metabolic Panel, Stat       Dx: Hepatomegaly - R16.0      * IMMUNOGLOBULIN QUANT IGAMQ, Stat       Dx: Hepatomegaly - R16.0      * Hepatitis B Core Ant, Stat       Dx: Hepatomegaly - R16.0      * Hepatitis C Virus Antibody, Stat       Dx: Hepatomegaly - R16.0      * Hep B Surface AG SCREEN CHBSA, Stat       Dx: Hepatomegaly - R16.0      * Flow Cytometry Leukemia Lymph, Stat       Dx: Hepatomegaly - R16.0            Patient Education      Patient Education Provided:  Yes                 Disclaimer: Converted document may not contain table formatting or lab diagrams. Please see [employer] [facility] for the authenticated document.

## 2021-05-28 NOTE — PROGRESS NOTES
Patient: TK NUNEZ     Acct: [id number]     Report: #[id number]  UNIT #: [id number]     : 1995    Encounter Date:2019  PRIMARY CARE: MEDHAT MURILLO  ***Signed***  --------------------------------------------------------------------------------------------------------------------  NURSE INTAKE      Visit Type      Established Patient Visit            Chief Complaint      hepatosplenomegaly            Referring Provider/Copies To      Referring Provider:  MEDHAT MURILLO      Primary Care Provider:  MEDHAT MURILLO            History and Present Illness      Past History      Ms. Nunez is a very pleasant 23-year-old lady who has a recent history of     infectious mononucleosis, at that time she was having some subtle cervical     lymphadenopathy which has now resolved. Patient denies any fever or chills at     this point. she has not had any significant weight loss. But she continues to     complain of mild abdominal tenderness. CT scan of the abdomen and pelvis showed     hepatosplenomegaly in February and now follow-up ultrasound shows splenomegaly     up to 16 cm in length and borderline hepatomegaly.  She has completd the workup     for any underlying lymphoproliferative disorder with flow cytometry of the     peripheral blood. We also  checked for hepatitis profile.  Patient continues to     complain of left-sided abdominal pain and has previous history of ulcers because    of which she is currently taking Zantac. Patient is known to be diabetic and     recently her glucose was as elevated as for 450 recently.            HPI - Hematology Interim      Pt returns to office for f/u splenomegaly.  She continues to experience pain     radiating to her middle back that is excruciating at times--by evenings she     reports it is unbearable.  Recent scans:  18 CT abd/pelvis showed     diffusely enlarged liver; however, no discrete mass lesion identified.  Pancreas    appears normal.   Spleen is enlarged measuring 15 cm in length with no focal mass    identified.   12/10/18 GB scan was benign.  She reports she is being treated for    a gastric ulceration and will have endoscopy in March-it is scheduled at [facility]     surgical ctr-she is unsure if an assigned MD.  No recent lab work completed.  No    jaundice noted.  Denies excessive bleeding or bruising noted.  She has never     seen liver MD.  Denies fever, lymphadenopathy or distress noted.            Most Recent Imaging Findings      Patient: TK NUNEZ   Acct: #[id number]   Report: #[id number]            UNIT #: [id number]    DOS: 18 1600      INSURANCE:[employer]   ORDER #:[id number]      LOCATION:[facility]     : 1995            PROVIDERS      ADMITTING:     ATTENDING: DION ARIAS      FAMILY:  MEDHAT MURILLO   ORDERING:  DION ARIAS         OTHER:    DICTATING:  Rodney Sanderson MD            REQ #:[id number]   EXAM:ABDPELWO - CT ABDOMEN PELVIS wo CONTRAST      REASON FOR EXAM:        REASON FOR VISIT:  CYSTITIS LBP            *******Signed******         PROCEDURE:   CT ABDOMEN PELVIS WITHOUT CONTRAST             COMPARISON:   [facility], CT, ABDOMEN/PELVIS WITH CONTRAST,     2018, 12:45.             INDICATIONS:   BILATERAL FLANK PAIN. RECENT UTI, CYSTITIS. PT DENIES PREGNANCY.             TECHNIQUE:   CT images were created without intravenous contrast.               PROTOCOL:     Standard imaging protocol performed                RADIATION:     DLP: 336.8mGy*cm          Automated exposure control was utilized to minimize radiation dose.              FINDINGS:         Visualized lung bases are clear.  The liver is diffusely enlarged.  No discrete     mass lesion       identified.  Pancreas appears normal.  Spleen is enlarged measuring 15 cm in     length.  No focal       splenic mass identified.  Bilateral adrenal glands are within normal limits.      There is no evidence       of  renal or ureteral stone.  There is no hydronephrosis.  Gallbladder is     contracted but otherwise       appears within normal limits.  There is no abdominal adenopathy or free     intraperitoneal fluid.  GI       tract is within normal limits.             Pelvis:  Urinary bladder is within normal limits.  Uterus and ovaries appear     within normal limits.        The GI tract including the appendix is within normal limits.  There is no pelvic    or inguinal       adenopathy.  Visualized bony structures are within normal limits.             CONCLUSION:         1. No evidence of renal or ureteral stone or hydronephrosis.      2. Hepato splenomegaly              DARRYL NOONAN MD             Electronically Signed and Approved By: DARRYL NOONAN MD on 2018 at 16:29                           Until signed, this is an unconfirmed preliminary report that may contain      errors and is subject to change.                                              STEBA:      D:18 1629            PAST, FAMILY   Past Medical History      Past Medical History:  Diabetes Type 1      Other PMH      ULCER      Other Hematology/Oncology      SPLENOMEGALY            Past Surgical History      C SECTION            Family History      Family History:  Breast Cancer (PATERNAL AND MATERNAL AUNT), Cervical Cancer     (SISTER AUNT X 2), Colorectal Cancer (PATERNAL GRANDMOTHER), Liver Cancer     (MOTHER), Lung Cancer (PATERNAL UNCLE)      MOTHER  OF LIVER CA. FATHER STILL LIVING.            Social History      Marital Status:        Lives independently:  Yes      Number of Children:  3            Tobacco Use      Tobacco status:  Never smoker            Alcohol Use      Alcohol intake:  None            Substance Use      Substance use:  Denies use            REVIEW OF SYSTEMS      General:  Admits: Fatigue;          Denies: Appetite Change, Fever, Night Sweats, Weight Gain, Weight Loss      Eye:  Denies: Blurred Vision, Corrective  Lenses, Diplopia, Eye Irritation, Eye     Pain, Eye Redness, Spots in Vision, Vision Loss      ENT:  Denies: Headache, Hearing Loss, Hoarseness, Seizures, Sinus Congestion,     Sore Throat      Cardiovascular:  Denies: Chest Pain, Edema Ankles, Edema Legs, Irregular     Heartbeat, Palpitations      Respiratory:  Denies: Coughing Blood, Productive Cough, Shortness of Air,     Wheezing      Gastrointestinal:  Admits: Constipation, Diarrhea, Frequent Heartburn;          Denies: Bloody Stools, Nausea, Problem Swallowing, Tarry Stools, Unable to     Control Bowels, Vomiting      Genitourinary (female):  Denies: Blood in Urine, Decrease Urine Stream, Frequent    Urination, Incontinence, Painful Urination      Musculoskeletal:  Admits: Back Pain;          Denies: Leg Cramps, Muscle Pain, Muscle Weakness, Painful Joints, Swollen     Joints      Integumentary:  Denies: Bleeds Easily, Bruises Easily, Hair Changes, Jaundice,     Lesions, Mole Changes, Nail Changes, Pigment Changes, Rash, Skin Discoloration      Neurologic:  Admits: Numbness\Tingling      Psychiatric:  Denies: Anxiety, Confused, Depression, Disoriented, Memory Loss      Endocrine:  Admits: Diabetes (TYPE 1);          Denies: Cold Intolerance, Excessive Sweating, Excessive Thirst, Excessive     Urination, Heat Intolerance, Flushing, Hyperthyroidism, Hypothyroidism      Hematologic/Lymphatic:  Denies: Bruising, Bleeding, Enlarged Lymph Nodes,     Recurrent Infections, Transfusions      Reproductive:  Admits: Heavy Periods            VITAL SIGNS AND SCORES      Vitals      Height 5 ft 3.58 in / 161.5 cm      Weight 132 lbs 4.416 oz / 60 kg      BSA 1.63 m2      BMI 23.0 kg/m2      Temperature 98.2 F / 36.78 C - Temporal      Pulse 80      Respirations 16      Blood Pressure 145/103 Sitting, Right Arm      Pulse Oximetry 95%, RM AIR            Pain Score      Pain Scale Used:  Numerical      Pain Intensity:  6            Fatigue Score      Fatigue (0-10 scale):  8             EXAM      General Appearance:  Positive for: Alert, Oriented x3, Cooperative;          Negative for: Acute Distress      Respiratory:  Positive for: CTAB, Normal Respiratory Effort      Abdomen/Gastro:  Positive for: Normal Active Bowel Sounds, Hepatosplenomegaly,     Soft;          Negative for: Distention, Tenderness      Cardiovascular:  Positive for: RRR;          Negative for: Gallop, Murmur, Peripheral Edema, Rub      Psychiatric:  Positive for: Appropriate Affect, Intact Judgement      Lymphatic:  Negative for: Axillary, Cervical, Infraclavicular, Supraclavicular            PREVENTION      Hx Influenza Vaccination:  No      2 or More Falls Past Year?:  No      Fall Past Year with Injury?:  No      Hx Pneumococcal Vaccination:  No      Encouraged to follow-up with:  PCP regarding preventative exams.      Chart initiated by      ANTONIO CORLEY MA            ALLERGY/MEDS      Allergies      Coded Allergies:             NO KNOWN DRUG ALLERGIES (Verified  Allergy, Unknown, 1/7/19)            Medications      Last Reconciled on 1/7/19 14:29 by KARI MURRIETA      Esomeprazole Mag (NexIUM) 20 Mg Capsule.dr      20 MG PO BIDAC, #60 CAP 2 Refills         Prov: DAVID GRANDA         6/4/18       Albuterol (Proair HFA) 8.5 Gm Inh      1 PUFFS INH RTQ4H, #1 INH 0 Refills         Prov: COLLETTE BUCHANAN cfr         12/4/17       Azithromycin Z-Tom (Zithromax Z-Tom) 250 Mg Tablet      250 MG PO ASDIR, #1 PKT         Prov: COLLETTE BUCHANAN cfr         12/4/17       Benzonatate (Tessalon Perles) 100 Mg Cap      100 MG PO TID, #15 CAP         Prov: COLLETTE BUCHANAN cfr         12/4/17       Insulin Lispro (HumaLOG VIAL*) Unknown Strength Vial      SUBQ BID INSULIN, #1 VIAL 0 Refills         Reported         12/4/17      Medications Reviewed:  No Changes made to meds            IMPRESSION/PLAN      Impression      Splenomegaly.  Initially felt to be related to mononucleosis but she is now >1-    year out from that diagnosis.             Diagnosis      Hepatosplenomegaly - R16.2      Patient has hepato-splenomegaly on examination as well as on recent CT scans.  I    will check basic lab work today.  The splenomegaly is likely related to     underlying liver disease.  She will be referred to gastroenterology for further     evaluation.  Follow-up in 6 months or based on symptoms      New Diagnostics      * CMP Comp Metabolic Panel, Routine      * CBC With Auto Diff, Routine      New Referrals      * Gastroenterology, As Soon As Possible         Anshu            Patient Education            DI for Enlarged Spleen      Patient Education Provided:  Yes            Topics Patient Counseled on      Hepatosplenomegaly                 Disclaimer: Converted document may not contain table formatting or lab diagrams. Please see [employer] [facility] for the authenticated document.

## 2021-10-18 ENCOUNTER — OFFICE VISIT (OUTPATIENT)
Dept: OBSTETRICS AND GYNECOLOGY | Facility: CLINIC | Age: 26
End: 2021-10-18

## 2021-10-18 VITALS
DIASTOLIC BLOOD PRESSURE: 73 MMHG | WEIGHT: 154 LBS | HEIGHT: 62 IN | SYSTOLIC BLOOD PRESSURE: 107 MMHG | BODY MASS INDEX: 28.34 KG/M2 | HEART RATE: 80 BPM

## 2021-10-18 DIAGNOSIS — R10.2 PELVIC PAIN IN FEMALE: ICD-10-CM

## 2021-10-18 DIAGNOSIS — N76.0 ACUTE VAGINITIS: ICD-10-CM

## 2021-10-18 DIAGNOSIS — N93.9 ABNORMAL UTERINE BLEEDING: Primary | ICD-10-CM

## 2021-10-18 LAB
B-HCG UR QL: NEGATIVE
CANDIDA SPECIES: NEGATIVE
EXPIRATION DATE: NORMAL
GARDNERELLA VAGINALIS: NEGATIVE
INTERNAL NEGATIVE CONTROL: NORMAL
INTERNAL POSITIVE CONTROL: NORMAL
Lab: NORMAL
T VAGINALIS DNA VAG QL PROBE+SIG AMP: NEGATIVE

## 2021-10-18 PROCEDURE — 81025 URINE PREGNANCY TEST: CPT | Performed by: OBSTETRICS & GYNECOLOGY

## 2021-10-18 PROCEDURE — 87660 TRICHOMONAS VAGIN DIR PROBE: CPT | Performed by: OBSTETRICS & GYNECOLOGY

## 2021-10-18 PROCEDURE — 87510 GARDNER VAG DNA DIR PROBE: CPT | Performed by: OBSTETRICS & GYNECOLOGY

## 2021-10-18 PROCEDURE — 99213 OFFICE O/P EST LOW 20 MIN: CPT | Performed by: OBSTETRICS & GYNECOLOGY

## 2021-10-18 PROCEDURE — 87480 CANDIDA DNA DIR PROBE: CPT | Performed by: OBSTETRICS & GYNECOLOGY

## 2021-10-18 RX ORDER — INSULIN PUMP CONTROLLER
EACH MISCELLANEOUS
COMMUNITY
Start: 2021-08-25

## 2021-10-18 RX ORDER — PEN NEEDLE, DIABETIC 29 G X1/2"
NEEDLE, DISPOSABLE MISCELLANEOUS
COMMUNITY
Start: 2021-10-07

## 2021-10-18 RX ORDER — PROCHLORPERAZINE 25 MG/1
SUPPOSITORY RECTAL
COMMUNITY
Start: 2021-10-16

## 2021-10-18 RX ORDER — PROCHLORPERAZINE 25 MG/1
SUPPOSITORY RECTAL
COMMUNITY
Start: 2021-09-23

## 2021-10-18 RX ORDER — INSULIN PUMP CONTROLLER
EACH MISCELLANEOUS
COMMUNITY
Start: 2021-06-15

## 2021-10-18 NOTE — PROGRESS NOTES
"GYN Problem/Follow Up Visit    Chief Complaint   Patient presents with   • menses late     THEN BLED FOR LESS THAN 24HRS   • Dysmenorrhea           HPI  Yamilet Dempsey is a 26 y.o. female, , who presents for aub and pelvic pain. States always very regular menses but this past menses she was several days late and then it only lasted one day. Normal flow. Also states has had intermittent pelvic cramping for a few weeks. Also states increased clear vag d/c. Denies odor or itching or new sex partners. Also wants to see dr dubon to discuss why she isn't getting pregnant. It's been almost one full year of actively trying. Had two miscarriages last year and dr dubon was going to look into it if pt did not successfully conceive.       Additional OB/GYN History   No LMP recorded.  Current contraception: contraceptive methods: None  Desires to: do not start contraception  Allergies : Patient has no known allergies.     The additional following portions of the patient's history were reviewed and updated as appropriate: allergies, current medications, past family history, past medical history, past social history, past surgical history and problem list.    Review of Systems    I have reviewed and agree with the HPI, ROS, and historical information as entered above. Elvia Yuan, APRN    Objective   /73   Pulse 80   Ht 157.5 cm (62\")   Wt 69.9 kg (154 lb)   BMI 28.17 kg/m²     Physical Exam  Vitals reviewed.   Genitourinary:     General: Normal vulva.      Vagina: Vaginal discharge (small amt clear whitish d/c) present. No erythema, tenderness, bleeding or lesions.      Cervix: Normal.      Uterus: Tender (mildly).       Adnexa: Left adnexa normal.        Right: Tenderness (mildly) present. No fullness.     Skin:     General: Skin is warm and dry.   Neurological:      Mental Status: She is alert and oriented to person, place, and time.            Assessment and Plan    Diagnoses and all orders for this " visit:    1. Abnormal uterine bleeding (Primary)  -     POC Pregnancy, Urine  -     Cancel: US Pelvis Transvaginal Non OB; Future  -     Gardnerella vaginalis, Trichomonas vaginalis, Candida albicans, DNA - Swab, Vagina    2. Pelvic pain in female  -     Cancel: US Pelvis Transvaginal Non OB; Future  -     Gardnerella vaginalis, Trichomonas vaginalis, Candida albicans, DNA - Swab, Vagina    3. Acute vaginitis  -     Gardnerella vaginalis, Trichomonas vaginalis, Candida albicans, DNA - Swab, Vagina    recommend pelvic u/s due to pt hx of ovarian cyst. Pt declines at this time. States will discuss with dr dubon.    Counseling:  She understands the importance of having the above orders performed in a timely fashion.  She is encouraged to review her results online and/or contact or office if she has questions.     Follow Up:  Return for appt with dr dubon for preconception counseling/recurrent miscarriages.      Elvia Yuan, APRN  10/18/2021

## 2021-11-15 ENCOUNTER — OFFICE VISIT (OUTPATIENT)
Dept: OBSTETRICS AND GYNECOLOGY | Facility: CLINIC | Age: 26
End: 2021-11-15

## 2021-11-15 VITALS
HEART RATE: 96 BPM | WEIGHT: 154 LBS | BODY MASS INDEX: 27.29 KG/M2 | HEIGHT: 63 IN | SYSTOLIC BLOOD PRESSURE: 106 MMHG | DIASTOLIC BLOOD PRESSURE: 74 MMHG

## 2021-11-15 DIAGNOSIS — E10.9 TYPE 1 DIABETES MELLITUS WITHOUT COMPLICATION (HCC): ICD-10-CM

## 2021-11-15 DIAGNOSIS — R16.2 HEPATOSPLENOMEGALY: ICD-10-CM

## 2021-11-15 DIAGNOSIS — N93.9 ABNORMAL UTERINE BLEEDING (AUB): Primary | ICD-10-CM

## 2021-11-15 DIAGNOSIS — D69.6 THROMBOCYTOPENIA (HCC): ICD-10-CM

## 2021-11-15 PROBLEM — K25.9 GASTRIC ULCER: Status: ACTIVE | Noted: 2021-11-15

## 2021-11-15 PROBLEM — E66.3 OVERWEIGHT WITH BODY MASS INDEX (BMI) 25.0-29.9: Status: ACTIVE | Noted: 2021-05-18

## 2021-11-15 PROCEDURE — 99214 OFFICE O/P EST MOD 30 MIN: CPT | Performed by: OBSTETRICS & GYNECOLOGY

## 2021-11-15 RX ORDER — FOLIC ACID 1 MG/1
1 TABLET ORAL DAILY
COMMUNITY

## 2021-11-15 NOTE — PROGRESS NOTES
"GYN Visit    CC: Abnormal periods    HPI:   26 y.o. who presents to discuss her abnormal periods and pregnancy.  The patient had 2 miscarriages last year.  She desires to be pregnant once again.  It has been almost a year since her last pregnancy and she has not had a conception.  Her menses usually is every 28 days lasting about 4 to 5 days.  More recently her cycles have been somewhat irregular.  Last month she reports that she was 8 days late with her cycle and it only lasted 2 days.  She reports that the cycle was 3 days early and lasted only 2 days.  Her flow is light she denies any significant dysmenorrhea.  She does report some intermittent right lower quadrant pain and was told she had a cyst on a CT scan several months ago.  She reports she has inconsistently positive ovulation predictor kits.  The last 2 been negative.  She also reports that she has had some significant evaluation for thrombocytopenia and hepatosplenomegaly.  She reports having multiple liver biopsies that were normal.  She states that her physicians told her she had an autoimmune disorder but no specific diagnosis was given.  She is now seeing a homeopathic provider.  She reports her last A1c was approximately 7.      History: PMHx, Meds, Allergies, PSHx, Social Hx, and POBHx all reviewed and updated.    /74   Pulse 96   Ht 160 cm (63\")   Wt 69.9 kg (154 lb)   BMI 27.28 kg/m²     Physical Exam  Vitals and nursing note reviewed.   Constitutional:       Appearance: Normal appearance. She is normal weight.   Abdominal:      General: Abdomen is flat. There is no distension.      Palpations: Abdomen is soft. There is no mass.      Tenderness: There is no abdominal tenderness. There is no guarding or rebound.      Hernia: No hernia is present.   Musculoskeletal:         General: No swelling.      Right lower leg: No edema.      Left lower leg: No edema.   Skin:     General: Skin is warm and dry.      Coloration: Skin is not " jaundiced or pale.      Findings: No bruising, erythema, lesion or rash.   Neurological:      Mental Status: She is alert and oriented to person, place, and time.   Psychiatric:         Mood and Affect: Mood normal.         Behavior: Behavior normal.         Thought Content: Thought content normal.           ASSESSMENT AND PLAN:  Diagnoses and all orders for this visit:    1. Abnormal uterine bleeding (AUB) (Primary)  -     Antimullerian Hormone (AMH); Future  -     Progesterone; Future  -     Prolactin; Future  -     US Pelvis Transvaginal Non OB; Future    2. Thrombocytopenia (HCC)  Overview:  Last Assessment & Plan:   Formatting of this note might be different from the original.  Condition: stable  Discussed with Yamilet the importance of management of chronic conditions. Discussed importance of adherence to all medical appointments. Member is followed by Hematology and oncologyMarcela.   Follow up in: six months with PCP/Specialist.    Orders:  -     CBC (No Diff); Future    3. Type 1 diabetes mellitus without complication (HCC)  Assessment & Plan:  Continue insulin therapy via insulin pump.  Continue to monitor diet and blood sugars.    Orders:  -     Hemoglobin A1c; Future    4. Hepatosplenomegaly  Overview:  Last Assessment & Plan:   Formatting of this note might be different from the original.  Condition: stable    Discussed with Yamilet the importance of management of chronic conditions. Discussed importance of adherence to all medical appointments. Member is followed by Hematology and oncologyMarcela.   Follow up in: six months with PCP/Specialist.    Orders:  -     Comprehensive Metabolic Panel; Future      Counseling: Prenatal vitamin daily    Follow Up:  Return in about 2 weeks (around 11/29/2021) for after ultrasound.      Edgar Lebron MD  11/15/2021

## 2021-11-29 ENCOUNTER — LAB (OUTPATIENT)
Dept: OBSTETRICS AND GYNECOLOGY | Facility: CLINIC | Age: 26
End: 2021-11-29

## 2021-11-29 DIAGNOSIS — E10.9 TYPE 1 DIABETES MELLITUS WITHOUT COMPLICATION (HCC): ICD-10-CM

## 2021-11-29 DIAGNOSIS — D69.6 THROMBOCYTOPENIA (HCC): ICD-10-CM

## 2021-11-29 DIAGNOSIS — N93.9 ABNORMAL UTERINE BLEEDING (AUB): ICD-10-CM

## 2021-11-29 DIAGNOSIS — R16.2 HEPATOSPLENOMEGALY: ICD-10-CM

## 2021-11-29 LAB
ALBUMIN SERPL-MCNC: 4.8 G/DL (ref 3.5–5.2)
ALBUMIN/GLOB SERPL: 2 G/DL
ALP SERPL-CCNC: 80 U/L (ref 39–117)
ALT SERPL W P-5'-P-CCNC: 19 U/L (ref 1–33)
ANION GAP SERPL CALCULATED.3IONS-SCNC: 8 MMOL/L (ref 5–15)
AST SERPL-CCNC: 34 U/L (ref 1–32)
BILIRUB SERPL-MCNC: 0.2 MG/DL (ref 0–1.2)
BUN SERPL-MCNC: 8 MG/DL (ref 6–20)
BUN/CREAT SERPL: 9.2 (ref 7–25)
CALCIUM SPEC-SCNC: 9.4 MG/DL (ref 8.6–10.5)
CHLORIDE SERPL-SCNC: 105 MMOL/L (ref 98–107)
CO2 SERPL-SCNC: 25 MMOL/L (ref 22–29)
CREAT SERPL-MCNC: 0.87 MG/DL (ref 0.57–1)
DEPRECATED RDW RBC AUTO: 46.3 FL (ref 37–54)
ERYTHROCYTE [DISTWIDTH] IN BLOOD BY AUTOMATED COUNT: 14.7 % (ref 12.3–15.4)
GFR SERPL CREATININE-BSD FRML MDRD: 78 ML/MIN/1.73
GLOBULIN UR ELPH-MCNC: 2.4 GM/DL
GLUCOSE SERPL-MCNC: 99 MG/DL (ref 65–99)
HBA1C MFR BLD: 7 % (ref 4.8–5.6)
HCT VFR BLD AUTO: 41.8 % (ref 34–46.6)
HGB BLD-MCNC: 13.7 G/DL (ref 12–15.9)
MCH RBC QN AUTO: 28 PG (ref 26.6–33)
MCHC RBC AUTO-ENTMCNC: 32.8 G/DL (ref 31.5–35.7)
MCV RBC AUTO: 85.3 FL (ref 79–97)
PLATELET # BLD AUTO: 128 10*3/MM3 (ref 140–450)
PMV BLD AUTO: 12.3 FL (ref 6–12)
POTASSIUM SERPL-SCNC: 4.5 MMOL/L (ref 3.5–5.2)
PROGEST SERPL-MCNC: 9 NG/ML
PROLACTIN SERPL-MCNC: 6.58 NG/ML (ref 4.79–23.3)
PROT SERPL-MCNC: 7.2 G/DL (ref 6–8.5)
RBC # BLD AUTO: 4.9 10*6/MM3 (ref 3.77–5.28)
SODIUM SERPL-SCNC: 138 MMOL/L (ref 136–145)
WBC NRBC COR # BLD: 4.78 10*3/MM3 (ref 3.4–10.8)

## 2021-11-29 PROCEDURE — 83036 HEMOGLOBIN GLYCOSYLATED A1C: CPT | Performed by: OBSTETRICS & GYNECOLOGY

## 2021-11-29 PROCEDURE — 85027 COMPLETE CBC AUTOMATED: CPT | Performed by: OBSTETRICS & GYNECOLOGY

## 2021-11-29 PROCEDURE — 80053 COMPREHEN METABOLIC PANEL: CPT | Performed by: OBSTETRICS & GYNECOLOGY

## 2021-11-29 PROCEDURE — 82397 CHEMILUMINESCENT ASSAY: CPT | Performed by: OBSTETRICS & GYNECOLOGY

## 2021-11-29 PROCEDURE — 84146 ASSAY OF PROLACTIN: CPT | Performed by: OBSTETRICS & GYNECOLOGY

## 2021-11-29 PROCEDURE — 84144 ASSAY OF PROGESTERONE: CPT | Performed by: OBSTETRICS & GYNECOLOGY

## 2021-12-02 LAB — MIS SERPL-MCNC: 9.69 NG/ML

## 2021-12-07 ENCOUNTER — OFFICE VISIT (OUTPATIENT)
Dept: OBSTETRICS AND GYNECOLOGY | Facility: CLINIC | Age: 26
End: 2021-12-07

## 2021-12-07 VITALS
HEART RATE: 79 BPM | WEIGHT: 156 LBS | BODY MASS INDEX: 27.63 KG/M2 | SYSTOLIC BLOOD PRESSURE: 121 MMHG | DIASTOLIC BLOOD PRESSURE: 80 MMHG

## 2021-12-07 DIAGNOSIS — E10.9 TYPE 1 DIABETES MELLITUS WITHOUT COMPLICATION (HCC): ICD-10-CM

## 2021-12-07 DIAGNOSIS — D69.6 THROMBOCYTOPENIA (HCC): ICD-10-CM

## 2021-12-07 DIAGNOSIS — N93.9 ABNORMAL UTERINE BLEEDING (AUB): Primary | ICD-10-CM

## 2021-12-07 PROCEDURE — 99214 OFFICE O/P EST MOD 30 MIN: CPT | Performed by: OBSTETRICS & GYNECOLOGY

## 2021-12-07 NOTE — PROGRESS NOTES
GYN Visit    CC: Follow-up labs    HPI:   26 y.o. who presents to follow-up on some laboratory evaluation and pelvic ultrasound.  The patient has no problems today.  She states her menstrual cycle should be coming in the next 2 days.  She is trying for pregnancy.    History: PMHx, Meds, Allergies, PSHx, Social Hx, and POBHx all reviewed and updated.    /80   Pulse 79   Wt 70.8 kg (156 lb)   LMP 2021 (Exact Date)   BMI 27.63 kg/m²     Physical Exam  Vitals and nursing note reviewed.   Constitutional:       General: She is not in acute distress.     Appearance: Normal appearance. She is not ill-appearing.   Abdominal:      General: Abdomen is flat. Bowel sounds are normal. There is no distension.      Palpations: Abdomen is soft. There is no mass.      Tenderness: There is no abdominal tenderness. There is no guarding or rebound.      Hernia: No hernia is present.   Musculoskeletal:      Right lower leg: No edema.      Left lower leg: No edema.   Skin:     General: Skin is warm and dry.      Findings: No rash.   Neurological:      Mental Status: She is alert and oriented to person, place, and time.   Psychiatric:         Mood and Affect: Mood normal.         Behavior: Behavior normal.         Thought Content: Thought content normal.       Labs 11 29,021: Progesterone 9, AMH 9, CMP reviewed, prolactin 6    Ultrasound 2021 the uterus 7.6 x 5.7 x 5.3 cm.  The endometrium is 11.3 mm.  The uterus is retroverted.  There does appear to be some pelvic congestion.  The left ovary is 2.5 x 1.3 cm.  The right ovary is 2.8 x 1.2 cm.  There are no adnexal masses or free fluid.  There were numerous follicles measuring less than 1 cm consistent with a pattern of PCOS.    ASSESSMENT AND PLAN:  Diagnoses and all orders for this visit:    1. Abnormal uterine bleeding (AUB) (Primary)  Assessment & Plan:  ?  PCOS/anovulation  Most recent progesterone is 9 suggesting this past menstrual cycle was ovulatory.  I  anticipate the patient should have her menstrual cycle as expected.  If she misses her menstrual cycle she should check her pregnancy test and notify me the results.  If her menstrual cycle starts on time she will monitor ovulation predictors and return on day 21 for a repeat progesterone.  Continue prenatal vitamins    Orders:  -     Progesterone; Future    2. Thrombocytopenia (HCC)  Overview:  Last Assessment & Plan:   Formatting of this note might be different from the original.  Condition: stable  Discussed with Yamilet the importance of management of chronic conditions. Discussed importance of adherence to all medical appointments. Member is followed by Hematology and oncology, Marcela Claros.   Follow up in: six months with PCP/Specialist.    Assessment & Plan:  Platelets are stable at 128      3. Type 1 diabetes mellitus without complication (HCC)  Overview:  A1c on 11/29/2021 7    Assessment & Plan:  Continue insulin pump            Follow Up:  Return Results.      Edgar Lebron MD  12/07/2021

## 2021-12-07 NOTE — ASSESSMENT & PLAN NOTE
?  PCOS/anovulation  Most recent progesterone is 9 suggesting this past menstrual cycle was ovulatory.  I anticipate the patient should have her menstrual cycle as expected.  If she misses her menstrual cycle she should check her pregnancy test and notify me the results.  If her menstrual cycle starts on time she will monitor ovulation predictors and return on day 21 for a repeat progesterone.  Continue prenatal vitamins

## 2021-12-27 ENCOUNTER — LAB (OUTPATIENT)
Dept: OBSTETRICS AND GYNECOLOGY | Facility: CLINIC | Age: 26
End: 2021-12-27

## 2021-12-27 DIAGNOSIS — N93.9 ABNORMAL UTERINE BLEEDING (AUB): ICD-10-CM

## 2021-12-27 LAB — PROGEST SERPL-MCNC: 0.4 NG/ML

## 2021-12-27 PROCEDURE — 84144 ASSAY OF PROGESTERONE: CPT | Performed by: OBSTETRICS & GYNECOLOGY

## 2022-02-02 ENCOUNTER — OFFICE VISIT (OUTPATIENT)
Dept: OBSTETRICS AND GYNECOLOGY | Facility: CLINIC | Age: 27
End: 2022-02-02

## 2022-02-02 VITALS
WEIGHT: 158 LBS | HEIGHT: 63 IN | DIASTOLIC BLOOD PRESSURE: 70 MMHG | SYSTOLIC BLOOD PRESSURE: 118 MMHG | BODY MASS INDEX: 28 KG/M2 | HEART RATE: 68 BPM

## 2022-02-02 DIAGNOSIS — E10.9 TYPE 1 DIABETES MELLITUS WITHOUT COMPLICATION: ICD-10-CM

## 2022-02-02 DIAGNOSIS — D69.6 THROMBOCYTOPENIA: ICD-10-CM

## 2022-02-02 DIAGNOSIS — N97.0 INFERTILITY, ANOVULATION: Primary | ICD-10-CM

## 2022-02-02 PROCEDURE — 99214 OFFICE O/P EST MOD 30 MIN: CPT | Performed by: OBSTETRICS & GYNECOLOGY

## 2022-02-02 RX ORDER — LETROZOLE 2.5 MG/1
2.5 TABLET, FILM COATED ORAL DAILY
Qty: 5 TABLET | Refills: 0 | Status: SHIPPED | OUTPATIENT
Start: 2022-02-02 | End: 2022-02-07

## 2022-02-02 NOTE — PROGRESS NOTES
"GYN Visit    CC: Follow-up labs    HPI:   26 y.o. who presents in follow-up of evaluation of abnormal uterine bleeding and infertility.  The patient has no new complaints.  She reports her last menstrual cycle was normal.  She reports that her platelets have been stable and her blood sugars are improving.  Her last A1c was 7 approximately 3 months ago.  Still no conception.  Desires pregnancy.  Is interested in ovulation induction    History: PMHx, Meds, Allergies, PSHx, Social Hx, and POBHx all reviewed and updated.    /70   Pulse 68   Ht 160 cm (63\")   Wt 71.7 kg (158 lb)   LMP 01/10/2022   Breastfeeding No   BMI 27.99 kg/m²     Physical Exam  Vitals and nursing note reviewed.   Constitutional:       General: She is not in acute distress.     Appearance: Normal appearance. She is not ill-appearing.   Neck:      Thyroid: No thyroid mass or thyromegaly.   Cardiovascular:      Rate and Rhythm: Regular rhythm.      Heart sounds: No murmur heard.      Pulmonary:      Effort: Pulmonary effort is normal.      Breath sounds: No wheezing.   Abdominal:      General: There is no distension.      Palpations: Abdomen is soft. There is no mass.      Tenderness: There is no abdominal tenderness. There is no guarding or rebound.      Hernia: No hernia is present.   Musculoskeletal:      Right lower leg: No edema.      Left lower leg: No edema.   Skin:     General: Skin is warm and dry.      Findings: No rash.   Neurological:      Mental Status: She is alert and oriented to person, place, and time.   Psychiatric:         Mood and Affect: Mood normal.         Behavior: Behavior normal.         Thought Content: Thought content normal.         Judgment: Judgment normal.       Cycle day 21 progesterone 0.40    ASSESSMENT AND PLAN:  Diagnoses and all orders for this visit:    1. Infertility, anovulation (Primary)  Assessment & Plan:  Reviewed the risks, benefits, and alternatives of ovulation induction including " multiple gestation, ovarian hyperstimulation etc.  We discussed complications of pregnancy particularly associated with her diabetes and thrombocytopenia.  The patient knowledges all these risks and wished to proceed with ovulation induction.  Patient will start letrozole 2.5 mg daily for 5 days on cycle day #3.  She will check ovulation predictor cycle day 10 through 20.  She will have timed intercourse with a positive ovulation predictor every other day or every other day starting on day 10.  She will return on cycle day 21 for a progesterone.  She is to call me if she has a missed menstrual cycle or positive urine pregnancy test.    Orders:  -     letrozole (FEMARA) 2.5 MG tablet; Take 1 tablet by mouth Daily for 5 days.  Dispense: 5 tablet; Refill: 0  -     Progesterone; Future    2. Type 1 diabetes mellitus without complication (HCC)  Overview:  A1c on 11/29/2021 7    Assessment & Plan:  Discussed the need for strict control of her diabetes during her pregnancy.  Discussed of adverse outcomes during the pregnancy with poorly controlled diabetes.  Recommended maternal-fetal medicine management of her diabetes while pregnant.  Patient acknowledges her understanding and wishes to proceed with ovulation induction      3. Thrombocytopenia (HCC)  Overview:  Last Assessment & Plan:   Formatting of this note might be different from the original.  Condition: stable  Discussed with Yamilet the importance of management of chronic conditions. Discussed importance of adherence to all medical appointments. Member is followed by Hematology and oncology, Marcela Claros.   Follow up in: six months with PCP/Specialist.    Assessment & Plan:  Platelets have been stable.  The thrombocytopenia is mild.  Discussed that we would need to monitor this closely during her pregnancy.  Discussed that a precipitous drop in her platelets could result in the necessity for delivery in [place].    Follow Up:  Return in about 3 months (around  5/2/2022) for Recheck.    Edgar Lebron MD  02/02/2022

## 2022-02-03 PROBLEM — N97.0 INFERTILITY, ANOVULATION: Status: ACTIVE | Noted: 2022-02-03

## 2022-02-04 NOTE — ASSESSMENT & PLAN NOTE
Discussed the need for strict control of her diabetes during her pregnancy.  Discussed of adverse outcomes during the pregnancy with poorly controlled diabetes.  Recommended maternal-fetal medicine management of her diabetes while pregnant.  Patient acknowledges her understanding and wishes to proceed with ovulation induction

## 2022-02-04 NOTE — ASSESSMENT & PLAN NOTE
Reviewed the risks, benefits, and alternatives of ovulation induction including multiple gestation, ovarian hyperstimulation etc.  We discussed complications of pregnancy particularly associated with her diabetes and thrombocytopenia.  The patient knowledges all these risks and wished to proceed with ovulation induction.  Patient will start letrozole 2.5 mg daily for 5 days on cycle day #3.  She will check ovulation predictor cycle day 10 through 20.  She will have timed intercourse with a positive ovulation predictor every other day or every other day starting on day 10.  She will return on cycle day 21 for a progesterone.  She is to call me if she has a missed menstrual cycle or positive urine pregnancy test.  Continue daily prenatal vitamin

## 2022-02-04 NOTE — ASSESSMENT & PLAN NOTE
Platelets have been stable.  The thrombocytopenia is mild.  Discussed that we would need to monitor this closely during her pregnancy.  Discussed that a precipitous drop in her platelets could result in the necessity for delivery in [place].

## 2022-02-28 ENCOUNTER — LAB (OUTPATIENT)
Dept: OBSTETRICS AND GYNECOLOGY | Facility: CLINIC | Age: 27
End: 2022-02-28

## 2022-02-28 DIAGNOSIS — N97.0 INFERTILITY, ANOVULATION: ICD-10-CM

## 2022-02-28 LAB — PROGEST SERPL-MCNC: 17.4 NG/ML

## 2022-02-28 PROCEDURE — 84144 ASSAY OF PROGESTERONE: CPT | Performed by: OBSTETRICS & GYNECOLOGY

## 2022-03-02 ENCOUNTER — TELEPHONE (OUTPATIENT)
Dept: OBSTETRICS AND GYNECOLOGY | Facility: CLINIC | Age: 27
End: 2022-03-02

## 2022-03-02 DIAGNOSIS — N97.0 INFERTILITY, ANOVULATION: Primary | ICD-10-CM

## 2022-03-02 RX ORDER — LETROZOLE 2.5 MG/1
2.5 TABLET, FILM COATED ORAL DAILY
Qty: 5 TABLET | Refills: 2 | Status: SHIPPED | OUTPATIENT
Start: 2022-03-02 | End: 2022-03-07

## 2022-03-02 NOTE — TELEPHONE ENCOUNTER
----- Message from Edgar Lebron MD sent at 3/2/2022  6:50 AM EST -----  Notify patient of positive ovulation.  If she has a positive pregnancy test with a missed period she needs to notify me ASAP.  If she has her menses when expected, I have sent a prescription to her pharmacy for refills of the letrozole.  I would like to see the patient back in about 3 months, so please go ahead and schedule her appointment.

## 2022-03-09 ENCOUNTER — TELEPHONE (OUTPATIENT)
Dept: OBSTETRICS AND GYNECOLOGY | Facility: CLINIC | Age: 27
End: 2022-03-09

## 2022-03-09 ENCOUNTER — LAB (OUTPATIENT)
Dept: LAB | Facility: HOSPITAL | Age: 27
End: 2022-03-09

## 2022-03-09 DIAGNOSIS — N91.2 AMENORRHEA: Primary | ICD-10-CM

## 2022-03-09 DIAGNOSIS — N91.2 AMENORRHEA: ICD-10-CM

## 2022-03-09 LAB — HCG INTACT+B SERPL-ACNC: 76 MIU/ML

## 2022-03-09 PROCEDURE — 84702 CHORIONIC GONADOTROPIN TEST: CPT

## 2022-03-09 PROCEDURE — 36415 COLL VENOUS BLD VENIPUNCTURE: CPT

## 2022-03-09 NOTE — TELEPHONE ENCOUNTER
----- Message from Edgar Lebron MD sent at 3/9/2022  4:34 PM EST -----  Notify patient of + bhcg. Plan to repeat on Friday

## 2022-03-09 NOTE — TELEPHONE ENCOUNTER
Pt called this monring stating that she has gotten a positive preg test. And wants to know next step. Per Dr. ABAD pt to call the office ASAP to let him know. Please advise.

## 2022-03-11 ENCOUNTER — LAB (OUTPATIENT)
Dept: OBSTETRICS AND GYNECOLOGY | Facility: CLINIC | Age: 27
End: 2022-03-11

## 2022-03-11 DIAGNOSIS — N91.2 AMENORRHEA: ICD-10-CM

## 2022-03-11 LAB — HCG INTACT+B SERPL-ACNC: 203 MIU/ML

## 2022-03-11 PROCEDURE — 84702 CHORIONIC GONADOTROPIN TEST: CPT | Performed by: OBSTETRICS & GYNECOLOGY

## 2022-03-14 ENCOUNTER — TELEPHONE (OUTPATIENT)
Dept: OBSTETRICS AND GYNECOLOGY | Facility: CLINIC | Age: 27
End: 2022-03-14

## 2022-03-14 NOTE — TELEPHONE ENCOUNTER
----- Message from Edgar Lebron MD sent at 3/13/2022 10:05 PM EDT -----  Notify patient of appropriate bhcg rise. Recommend it be repeated on Tuesday

## 2022-03-14 NOTE — TELEPHONE ENCOUNTER
Patient advised of appropriate rise of BHCG and need to repeat again on 3/15/22.  Patient verbalizes understanding.

## 2022-03-15 ENCOUNTER — LAB (OUTPATIENT)
Dept: OBSTETRICS AND GYNECOLOGY | Facility: CLINIC | Age: 27
End: 2022-03-15

## 2022-03-15 DIAGNOSIS — N91.2 AMENORRHEA: ICD-10-CM

## 2022-03-15 LAB — HCG INTACT+B SERPL-ACNC: 1390 MIU/ML

## 2022-03-15 PROCEDURE — 84702 CHORIONIC GONADOTROPIN TEST: CPT | Performed by: OBSTETRICS & GYNECOLOGY

## 2022-03-16 ENCOUNTER — TELEPHONE (OUTPATIENT)
Dept: OBSTETRICS AND GYNECOLOGY | Facility: CLINIC | Age: 27
End: 2022-03-16

## 2022-03-16 DIAGNOSIS — O20.0 THREATENED ABORTION: ICD-10-CM

## 2022-03-16 DIAGNOSIS — N91.2 AMENORRHEA: Primary | ICD-10-CM

## 2022-03-16 NOTE — TELEPHONE ENCOUNTER
----- Message from Edgar Lebron MD sent at 3/16/2022  7:44 AM EDT -----  Notify patient of appropriately rising beta hCG.  I recommend she have a pelvic ultrasound in 7 to 10 days for viability and a new OB visit after the ultrasound.

## 2022-03-28 ENCOUNTER — INITIAL PRENATAL (OUTPATIENT)
Dept: OBSTETRICS AND GYNECOLOGY | Facility: CLINIC | Age: 27
End: 2022-03-28

## 2022-03-28 VITALS — BODY MASS INDEX: 27.28 KG/M2 | SYSTOLIC BLOOD PRESSURE: 123 MMHG | DIASTOLIC BLOOD PRESSURE: 77 MMHG | WEIGHT: 154 LBS

## 2022-03-28 DIAGNOSIS — O34.219 PREVIOUS CESAREAN DELIVERY AFFECTING PREGNANCY: ICD-10-CM

## 2022-03-28 DIAGNOSIS — D69.6 THROMBOCYTOPENIA: ICD-10-CM

## 2022-03-28 DIAGNOSIS — O24.319 PREGESTATIONAL DIABETES MELLITUS, MODIFIED WHITE CLASS B: ICD-10-CM

## 2022-03-28 DIAGNOSIS — O09.90 SUPERVISION OF HIGH RISK PREGNANCY, ANTEPARTUM: ICD-10-CM

## 2022-03-28 DIAGNOSIS — Z34.90 ENCOUNTER FOR SUPERVISION OF NORMAL PREGNANCY, ANTEPARTUM, UNSPECIFIED GRAVIDITY: Primary | ICD-10-CM

## 2022-03-28 PROBLEM — N97.0 INFERTILITY, ANOVULATION: Status: RESOLVED | Noted: 2022-02-03 | Resolved: 2022-03-28

## 2022-03-28 PROBLEM — N93.9 ABNORMAL UTERINE BLEEDING (AUB): Status: RESOLVED | Noted: 2021-11-15 | Resolved: 2022-03-28

## 2022-03-28 LAB
ABO GROUP BLD: NORMAL
ALBUMIN SERPL-MCNC: 4.9 G/DL (ref 3.5–5.2)
ALBUMIN/GLOB SERPL: 1.6 G/DL
ALP SERPL-CCNC: 74 U/L (ref 39–117)
ALT SERPL W P-5'-P-CCNC: 19 U/L (ref 1–33)
ANION GAP SERPL CALCULATED.3IONS-SCNC: 10.9 MMOL/L (ref 5–15)
AST SERPL-CCNC: 30 U/L (ref 1–32)
BASOPHILS # BLD AUTO: 0.02 10*3/MM3 (ref 0–0.2)
BASOPHILS NFR BLD AUTO: 0.3 % (ref 0–1.5)
BILIRUB SERPL-MCNC: 0.3 MG/DL (ref 0–1.2)
BLD GP AB SCN SERPL QL: NEGATIVE
BUN SERPL-MCNC: 6 MG/DL (ref 6–20)
BUN/CREAT SERPL: 7.8 (ref 7–25)
CALCIUM SPEC-SCNC: 9.6 MG/DL (ref 8.6–10.5)
CHLORIDE SERPL-SCNC: 103 MMOL/L (ref 98–107)
CO2 SERPL-SCNC: 23.1 MMOL/L (ref 22–29)
CREAT SERPL-MCNC: 0.77 MG/DL (ref 0.57–1)
DEPRECATED RDW RBC AUTO: 41.4 FL (ref 37–54)
EGFRCR SERPLBLD CKD-EPI 2021: 107.2 ML/MIN/1.73
EOSINOPHIL # BLD AUTO: 0.04 10*3/MM3 (ref 0–0.4)
EOSINOPHIL NFR BLD AUTO: 0.6 % (ref 0.3–6.2)
ERYTHROCYTE [DISTWIDTH] IN BLOOD BY AUTOMATED COUNT: 13.3 % (ref 12.3–15.4)
GLOBULIN UR ELPH-MCNC: 3 GM/DL
GLUCOSE SERPL-MCNC: 133 MG/DL (ref 65–99)
GLUCOSE UR STRIP-MCNC: NEGATIVE MG/DL
HBA1C MFR BLD: 7.3 % (ref 4.8–5.6)
HBV SURFACE AG SERPL QL IA: NORMAL
HCT VFR BLD AUTO: 40.3 % (ref 34–46.6)
HCV AB SER DONR QL: NORMAL
HGB BLD-MCNC: 13.4 G/DL (ref 12–15.9)
HIV1+2 AB SER QL: NORMAL
IMM GRANULOCYTES # BLD AUTO: 0.03 10*3/MM3 (ref 0–0.05)
IMM GRANULOCYTES NFR BLD AUTO: 0.4 % (ref 0–0.5)
LYMPHOCYTES # BLD AUTO: 1.27 10*3/MM3 (ref 0.7–3.1)
LYMPHOCYTES NFR BLD AUTO: 17.9 % (ref 19.6–45.3)
MCH RBC QN AUTO: 28.6 PG (ref 26.6–33)
MCHC RBC AUTO-ENTMCNC: 33.3 G/DL (ref 31.5–35.7)
MCV RBC AUTO: 85.9 FL (ref 79–97)
MONOCYTES # BLD AUTO: 0.37 10*3/MM3 (ref 0.1–0.9)
MONOCYTES NFR BLD AUTO: 5.2 % (ref 5–12)
NEUTROPHILS NFR BLD AUTO: 5.36 10*3/MM3 (ref 1.7–7)
NEUTROPHILS NFR BLD AUTO: 75.6 % (ref 42.7–76)
NRBC BLD AUTO-RTO: 0 /100 WBC (ref 0–0.2)
PLATELET # BLD AUTO: 164 10*3/MM3 (ref 140–450)
PMV BLD AUTO: 11.7 FL (ref 6–12)
POTASSIUM SERPL-SCNC: 3.7 MMOL/L (ref 3.5–5.2)
PROT SERPL-MCNC: 7.9 G/DL (ref 6–8.5)
PROT UR STRIP-MCNC: NEGATIVE MG/DL
RBC # BLD AUTO: 4.69 10*6/MM3 (ref 3.77–5.28)
RH BLD: POSITIVE
RPR SER QL: NORMAL
SODIUM SERPL-SCNC: 137 MMOL/L (ref 136–145)
T4 FREE SERPL-MCNC: 1.56 NG/DL (ref 0.93–1.7)
TSH SERPL DL<=0.05 MIU/L-ACNC: 0.92 UIU/ML (ref 0.27–4.2)
WBC NRBC COR # BLD: 7.09 10*3/MM3 (ref 3.4–10.8)

## 2022-03-28 PROCEDURE — 99214 OFFICE O/P EST MOD 30 MIN: CPT | Performed by: OBSTETRICS & GYNECOLOGY

## 2022-03-28 PROCEDURE — 86803 HEPATITIS C AB TEST: CPT | Performed by: OBSTETRICS & GYNECOLOGY

## 2022-03-28 PROCEDURE — 87491 CHLMYD TRACH DNA AMP PROBE: CPT | Performed by: OBSTETRICS & GYNECOLOGY

## 2022-03-28 PROCEDURE — 84443 ASSAY THYROID STIM HORMONE: CPT | Performed by: OBSTETRICS & GYNECOLOGY

## 2022-03-28 PROCEDURE — 80053 COMPREHEN METABOLIC PANEL: CPT | Performed by: OBSTETRICS & GYNECOLOGY

## 2022-03-28 PROCEDURE — 83020 HEMOGLOBIN ELECTROPHORESIS: CPT | Performed by: OBSTETRICS & GYNECOLOGY

## 2022-03-28 PROCEDURE — 87591 N.GONORRHOEAE DNA AMP PROB: CPT | Performed by: OBSTETRICS & GYNECOLOGY

## 2022-03-28 PROCEDURE — G0432 EIA HIV-1/HIV-2 SCREEN: HCPCS | Performed by: OBSTETRICS & GYNECOLOGY

## 2022-03-28 PROCEDURE — G0123 SCREEN CERV/VAG THIN LAYER: HCPCS | Performed by: OBSTETRICS & GYNECOLOGY

## 2022-03-28 PROCEDURE — 84439 ASSAY OF FREE THYROXINE: CPT | Performed by: OBSTETRICS & GYNECOLOGY

## 2022-03-28 PROCEDURE — 83036 HEMOGLOBIN GLYCOSYLATED A1C: CPT | Performed by: OBSTETRICS & GYNECOLOGY

## 2022-03-28 PROCEDURE — 87661 TRICHOMONAS VAGINALIS AMPLIF: CPT | Performed by: OBSTETRICS & GYNECOLOGY

## 2022-03-28 PROCEDURE — 87086 URINE CULTURE/COLONY COUNT: CPT | Performed by: OBSTETRICS & GYNECOLOGY

## 2022-03-28 NOTE — ASSESSMENT & PLAN NOTE
Continue insulin pump therapy.  Add a third basal rate starting at 8 AM until 1 PM of 2.0 units/h.  Monitor blood sugars closely around the lunchtime hour and if continues to still get significantly high elevations were made to increase that basal rate further or potentially add a corrective dose during this time.  Plan to refer to [facility] for management of diabetes.

## 2022-03-28 NOTE — PROGRESS NOTES
OB Initial Visit    CC- Here for care of current pregnancy     Subjective:  27 y.o.  presenting for her first obstetrical visit.    LMP: Patient's last menstrual period was 2022.   She has no complaints today.  Denies any vaginal bleeding or pelvic cramping.  She does report that her blood sugars have been somewhat elevated.  Her fastings are good in the 70s and 80s.  She states that most of the day they are less than 150, however right around lunchtime her blood sugar falls jumped to near 200-300.  She states this is regardless of whether she eats or not.  She currently only has 2 basal rates, which changes at 5 AM.      Reviewed and updated:  OBHx, GYNHx (STDs), PMHx, Medications, Allergies, PSHx, Social Hx, Preventative Hx (PAP), Hx of abuse/safe environment, Vaccine Hx including hx of chickenpox or vaccine, Genetic Hx (pt, FOB, both families).        Objective:  /77   Wt 69.9 kg (154 lb)   LMP 2022   BMI 27.28 kg/m²   General- NAD, alert and oriented, appropriate  Psych- Normal mood, good memory  Neck- No masses, no thyroid enlargement  CV- Regular rhythm, no murnurs  Resp- CTA to bases, no wheezes  Abdomen- Soft, non distended, non tender, no masses   External genitalia- Normal, no lesions  Urethra- Normal, no masses, non tender  Vagina- Normal, no discharge  Bladder- Normal, no masses, non tender  Cvx- Normal, no lesions, no discharge, no CMT  Uterus- Normal shape and consistency, non tender, Consistent with dates  Adnexa- Normal, no mass, non tender  Lymphatic- No palpable neck, axillary, or groin nodes  Ext- No edema, no cyanosis    Skin- No lesions, no rashes, no acanthosis nigricans      Assessment and Plan:  Diagnoses and all orders for this visit:    1. Encounter for supervision of normal pregnancy, antepartum, unspecified  (Primary)  -     POC Urinalysis Dipstick  -     Hemoglobinopathy Fractionation Cascade  -     IGP,CtNgTv,rfx Aptima HPV ASCU  -     OB Panel With  HIV  -     Urine Culture - Urine, Urine, Clean Catch  -     Ambulatory Referral to Perinatology  -     Hemoglobin A1c  -     TSH  -     T4, Free  -     Comprehensive Metabolic Panel; Future  -     Comprehensive Metabolic Panel    2. Thrombocytopenia (HCC)  Overview:  Etiology is unclear.  Patient has seen hematology/oncology with no formal diagnosis    Assessment & Plan:  We will monitor platelets throughout the pregnancy.  May need heme-onc consultation once again.      3. Pregestational diabetes mellitus, modified White class B  Overview:  Insulin pump  [facility] referral    Assessment & Plan:  Continue insulin pump therapy.  Add a third basal rate starting at 8 AM until 1 PM of 2.0 units/h.  Monitor blood sugars closely around the lunchtime hour and if continues to still get significantly high elevations were made to increase that basal rate further or potentially add a corrective dose during this time.  Plan to refer to [facility] for management of diabetes.      4. Supervision of high risk pregnancy, antepartum  Overview:  EDC: 11/15/2022    Declines prenatal genetic screening    Class B pregestational diabetes on an insulin pump  Prior  delivery with successful , desires TOLAC  Thrombocytopenia    Tdap vaccine:  Flu vaccine: Recommended  COVID-19 vaccine: Recommended    Assessment & Plan:  Check prenatal labs  Reviewed today's ultrasound and finalized FABIOLA  Continue prenatal vitamins  Referred to [facility]  Reviewed medication safe in pregnancy  Reviewed call schedule and office visit schedule  Declines prenatal genetic screening  Recommend COVID-19 and flu vaccinations  Discussed diet, exercise and appropriate nutrition in pregnancy      5. Previous  delivery affecting pregnancy  Overview:  Low transverse  delivery at 28 weeks for twins  Successful  at 36 weeks with a carter  Desires TOLAC this pregnancy.  The risks, benefits, alternatives of been reviewed        6w6d    Genetic Screening:  Counseled.  Declines all.     Vaccines: Recommend FLU vaccine this season, R/B discussed  Recommend COVID vaccine, R/B discussed    Counseling: Nutrition discussed, calories, activity/exercise in pregnancy  Discussed dietary restrictions/safety food preparation in pregnancy  Reviewed what to expect prenatal visits, office providers  Appropriate trimester precautions provided, N/V, vag bleeding, cramping  Questions answered    Return in about 2 weeks (around 4/11/2022) for Recheck.      Edgar Lebron MD  03/28/2022

## 2022-03-28 NOTE — ASSESSMENT & PLAN NOTE
Check prenatal labs  Reviewed today's ultrasound and finalized FABIOLA  Continue prenatal vitamins  Referred to [facility]  Reviewed medication safe in pregnancy  Reviewed call schedule and office visit schedule  Declines prenatal genetic screening  Recommend COVID-19 and flu vaccinations  Discussed diet, exercise and appropriate nutrition in pregnancy

## 2022-03-29 LAB
BACTERIA SPEC AEROBE CULT: NO GROWTH
HGB A MFR BLD ELPH: 97.2 % (ref 96.4–98.8)
HGB A2 MFR BLD ELPH: 2.8 % (ref 1.8–3.2)
HGB F MFR BLD ELPH: 0 % (ref 0–2)
HGB FRACT BLD-IMP: NORMAL
HGB S MFR BLD ELPH: 0 %
RUBV IGG SERPL IA-ACNC: 4.95 INDEX

## 2022-04-04 LAB
C TRACH RRNA CVX QL NAA+PROBE: NEGATIVE
CONV .: NORMAL
CYTOLOGIST CVX/VAG CYTO: NORMAL
CYTOLOGY CVX/VAG DOC CYTO: NORMAL
CYTOLOGY CVX/VAG DOC THIN PREP: NORMAL
DX ICD CODE: NORMAL
HIV 1 & 2 AB SER-IMP: NORMAL
N GONORRHOEA RRNA CVX QL NAA+PROBE: NEGATIVE
OTHER STN SPEC: NORMAL
STAT OF ADQ CVX/VAG CYTO-IMP: NORMAL
T VAGINALIS RRNA SPEC QL NAA+PROBE: NEGATIVE

## 2022-04-11 ENCOUNTER — TELEPHONE (OUTPATIENT)
Dept: OBSTETRICS AND GYNECOLOGY | Facility: CLINIC | Age: 27
End: 2022-04-11

## 2022-04-12 ENCOUNTER — ROUTINE PRENATAL (OUTPATIENT)
Dept: OBSTETRICS AND GYNECOLOGY | Facility: CLINIC | Age: 27
End: 2022-04-12

## 2022-04-12 VITALS — DIASTOLIC BLOOD PRESSURE: 68 MMHG | SYSTOLIC BLOOD PRESSURE: 107 MMHG | WEIGHT: 154 LBS | BODY MASS INDEX: 27.28 KG/M2

## 2022-04-12 DIAGNOSIS — O09.90 SUPERVISION OF HIGH RISK PREGNANCY, ANTEPARTUM: Primary | ICD-10-CM

## 2022-04-12 DIAGNOSIS — O24.319 PREGESTATIONAL DIABETES MELLITUS, MODIFIED WHITE CLASS B: ICD-10-CM

## 2022-04-12 DIAGNOSIS — E10.9 TYPE 1 DIABETES MELLITUS WITHOUT COMPLICATION: ICD-10-CM

## 2022-04-12 DIAGNOSIS — O34.219 PREVIOUS CESAREAN DELIVERY AFFECTING PREGNANCY: ICD-10-CM

## 2022-04-12 DIAGNOSIS — D69.6 THROMBOCYTOPENIA: ICD-10-CM

## 2022-04-12 PROCEDURE — 99214 OFFICE O/P EST MOD 30 MIN: CPT | Performed by: OBSTETRICS & GYNECOLOGY

## 2022-04-12 NOTE — PROGRESS NOTES
OB FOLLOW UP    CC: Scheduled OB routine FU     Prenatal care complicated by:   Patient Active Problem List   Diagnosis   • Hepatosplenomegaly   • Overweight with body mass index (BMI) 25.0-29.9   • Thrombocytopenia (HCC)   • Type 1 diabetes mellitus (HCC)   • Supervision of high risk pregnancy, antepartum   • Previous  delivery affecting pregnancy   • Pregestational diabetes mellitus, modified White class B       Subjective:   Patient has: No complaints, No leaking fluid, No vaginal bleeding, No contractions  No fetal movement yet.  Patient reports her blood sugars are much better with the increased basal rate in the afternoon.  She is still having occasional 160s to 180s and a very rare 200.  All of her fastings are normal.    Objective:  Urine glucose/protein- see flow sheet      /68   Wt 69.9 kg (154 lb)   LMP 2022   BMI 27.28 kg/m²   See OB flow for LE edema, and cvx exam if applicable  FHT: 156 BPM   Uterine Size: size equals dates      Assessment and Plan:  Diagnoses and all orders for this visit:    1. Supervision of high risk pregnancy, antepartum (Primary)  Overview:  EDC: 11/15/2022    Declines prenatal genetic screening    Class B pregestational diabetes on an insulin pump  Prior  delivery with successful , desires TOLAC  Thrombocytopenia    Tdap vaccine:  Flu vaccine: Recommended  COVID-19 vaccine: Recommended    Assessment & Plan:  Reviewed dating ultrasound and finalized EDC.  Review prenatal labs  Continue prenatal vitamins  Continue baby aspirin    Orders:  -     Cancel: POC Urinalysis Dipstick    2. Pregestational diabetes mellitus, modified White class B  Overview:  Insulin pump  MFM referral    Assessment & Plan:  Patient's blood sugars are improved, but not yet at goal.  Her fastings are all normal.  2-hour postprandials are still irregular with many elevated.  Rare now to see 200 but still above goal.  P.m. basal rate extended to throughout the day.  Patient  has upcoming MFM appointment.  Urged to keep this scheduled.      3. Type 1 diabetes mellitus without complication (HCC)  Overview:  A1c on 2021 7               3/28/22 7.3      4. Thrombocytopenia (HCC)  Overview:  Etiology is unclear.  Patient has seen hematology/oncology with no formal diagnosis    Assessment & Plan:  Platelets are stable on initial prenatal labs.  We will continue to trend throughout the pregnancy.      5. Previous  delivery affecting pregnancy  Overview:  Low transverse  delivery at 28 weeks for twins  Successful  at 36 weeks with a carter  Desires TOLAC this pregnancy.  The risks, benefits, alternatives of been reviewed          9w0d  Reassuring pregnancy progress    Counseling: First trimester precautions, bleeding, cramping, N/V    Questions answered    Return in about 2 weeks (around 2022) for Recheck.      Edgar Lebron MD  2022

## 2022-04-14 NOTE — ASSESSMENT & PLAN NOTE
Patient's blood sugars are improved, but not yet at goal.  Her fastings are all normal.  2-hour postprandials are still irregular with many elevated.  Rare now to see 200 but still above goal.  Continue insulin therapy with insulin pump, and increase p.m. basal rate extended to throughout the day.  Patient has upcoming MFM appointment.  Urged to keep this scheduled.

## 2022-04-14 NOTE — ASSESSMENT & PLAN NOTE
Platelets are stable on initial prenatal labs.  We will continue to trend throughout the pregnancy.

## 2022-04-14 NOTE — ASSESSMENT & PLAN NOTE
Reviewed dating ultrasound and finalized EDC.  Review prenatal labs  Continue prenatal vitamins  Continue baby aspirin

## 2022-04-25 ENCOUNTER — TELEPHONE (OUTPATIENT)
Dept: OBSTETRICS AND GYNECOLOGY | Facility: CLINIC | Age: 27
End: 2022-04-25

## 2022-04-28 ENCOUNTER — APPOINTMENT (OUTPATIENT)
Dept: GENERAL RADIOLOGY | Facility: HOSPITAL | Age: 27
End: 2022-04-28

## 2022-04-28 ENCOUNTER — APPOINTMENT (OUTPATIENT)
Dept: ULTRASOUND IMAGING | Facility: HOSPITAL | Age: 27
End: 2022-04-28

## 2022-04-28 ENCOUNTER — HOSPITAL ENCOUNTER (EMERGENCY)
Facility: HOSPITAL | Age: 27
Discharge: HOME | End: 2022-04-28
Attending: EMERGENCY MEDICINE | Admitting: EMERGENCY MEDICINE

## 2022-04-28 VITALS
RESPIRATION RATE: 16 BRPM | BODY MASS INDEX: 29.01 KG/M2 | DIASTOLIC BLOOD PRESSURE: 53 MMHG | OXYGEN SATURATION: 99 % | TEMPERATURE: 97.5 F | HEIGHT: 62 IN | HEART RATE: 74 BPM | SYSTOLIC BLOOD PRESSURE: 94 MMHG | WEIGHT: 157.63 LBS

## 2022-04-28 DIAGNOSIS — E16.2 HYPOGLYCEMIA: Primary | ICD-10-CM

## 2022-04-28 LAB
ALBUMIN SERPL-MCNC: 4.1 G/DL (ref 3.5–5.2)
ALBUMIN/GLOB SERPL: 1.5 G/DL
ALP SERPL-CCNC: 63 U/L (ref 39–117)
ALT SERPL W P-5'-P-CCNC: 9 U/L (ref 1–33)
ANION GAP SERPL CALCULATED.3IONS-SCNC: 12 MMOL/L (ref 5–15)
AST SERPL-CCNC: 24 U/L (ref 1–32)
BASOPHILS # BLD AUTO: 0.03 10*3/MM3 (ref 0–0.2)
BASOPHILS NFR BLD AUTO: 0.4 % (ref 0–1.5)
BILIRUB SERPL-MCNC: 0.2 MG/DL (ref 0–1.2)
BILIRUB UR QL STRIP: NEGATIVE
BUN SERPL-MCNC: 7 MG/DL (ref 6–20)
BUN/CREAT SERPL: 9.2 (ref 7–25)
CALCIUM SPEC-SCNC: 9.1 MG/DL (ref 8.6–10.5)
CHLORIDE SERPL-SCNC: 102 MMOL/L (ref 98–107)
CLARITY UR: CLEAR
CO2 SERPL-SCNC: 21 MMOL/L (ref 22–29)
COLOR UR: YELLOW
CREAT SERPL-MCNC: 0.76 MG/DL (ref 0.57–1)
DEPRECATED RDW RBC AUTO: 44 FL (ref 37–54)
EGFRCR SERPLBLD CKD-EPI 2021: 108.9 ML/MIN/1.73
EOSINOPHIL # BLD AUTO: 0.04 10*3/MM3 (ref 0–0.4)
EOSINOPHIL NFR BLD AUTO: 0.6 % (ref 0.3–6.2)
ERYTHROCYTE [DISTWIDTH] IN BLOOD BY AUTOMATED COUNT: 14 % (ref 12.3–15.4)
GLOBULIN UR ELPH-MCNC: 2.8 GM/DL
GLUCOSE BLDC GLUCOMTR-MCNC: 124 MG/DL (ref 70–99)
GLUCOSE BLDC GLUCOMTR-MCNC: 88 MG/DL (ref 70–99)
GLUCOSE SERPL-MCNC: 153 MG/DL (ref 65–99)
GLUCOSE UR STRIP-MCNC: NEGATIVE MG/DL
HCT VFR BLD AUTO: 35 % (ref 34–46.6)
HGB BLD-MCNC: 12 G/DL (ref 12–15.9)
HGB UR QL STRIP.AUTO: NEGATIVE
HOLD SPECIMEN: NORMAL
HOLD SPECIMEN: NORMAL
IMM GRANULOCYTES # BLD AUTO: 0.04 10*3/MM3 (ref 0–0.05)
IMM GRANULOCYTES NFR BLD AUTO: 0.6 % (ref 0–0.5)
KETONES UR QL STRIP: NEGATIVE
LEUKOCYTE ESTERASE UR QL STRIP.AUTO: NEGATIVE
LYMPHOCYTES # BLD AUTO: 0.8 10*3/MM3 (ref 0.7–3.1)
LYMPHOCYTES NFR BLD AUTO: 11.5 % (ref 19.6–45.3)
MCH RBC QN AUTO: 29.6 PG (ref 26.6–33)
MCHC RBC AUTO-ENTMCNC: 34.3 G/DL (ref 31.5–35.7)
MCV RBC AUTO: 86.2 FL (ref 79–97)
MONOCYTES # BLD AUTO: 0.32 10*3/MM3 (ref 0.1–0.9)
MONOCYTES NFR BLD AUTO: 4.6 % (ref 5–12)
NEUTROPHILS NFR BLD AUTO: 5.75 10*3/MM3 (ref 1.7–7)
NEUTROPHILS NFR BLD AUTO: 82.3 % (ref 42.7–76)
NITRITE UR QL STRIP: NEGATIVE
NRBC BLD AUTO-RTO: 0 /100 WBC (ref 0–0.2)
PH UR STRIP.AUTO: 6 [PH] (ref 5–8)
PLATELET # BLD AUTO: 106 10*3/MM3 (ref 140–450)
PMV BLD AUTO: 11.9 FL (ref 6–12)
POTASSIUM SERPL-SCNC: 3.2 MMOL/L (ref 3.5–5.2)
PROT SERPL-MCNC: 6.9 G/DL (ref 6–8.5)
PROT UR QL STRIP: NEGATIVE
RBC # BLD AUTO: 4.06 10*6/MM3 (ref 3.77–5.28)
SODIUM SERPL-SCNC: 135 MMOL/L (ref 136–145)
SP GR UR STRIP: <=1.005 (ref 1–1.03)
UROBILINOGEN UR QL STRIP: NORMAL
WBC NRBC COR # BLD: 6.98 10*3/MM3 (ref 3.4–10.8)
WHOLE BLOOD HOLD SPECIMEN: NORMAL
WHOLE BLOOD HOLD SPECIMEN: NORMAL

## 2022-04-28 PROCEDURE — 99284 EMERGENCY DEPT VISIT MOD MDM: CPT

## 2022-04-28 PROCEDURE — 80053 COMPREHEN METABOLIC PANEL: CPT | Performed by: EMERGENCY MEDICINE

## 2022-04-28 PROCEDURE — 36415 COLL VENOUS BLD VENIPUNCTURE: CPT

## 2022-04-28 PROCEDURE — 76815 OB US LIMITED FETUS(S): CPT

## 2022-04-28 PROCEDURE — 82962 GLUCOSE BLOOD TEST: CPT

## 2022-04-28 PROCEDURE — 93005 ELECTROCARDIOGRAM TRACING: CPT | Performed by: EMERGENCY MEDICINE

## 2022-04-28 PROCEDURE — 81003 URINALYSIS AUTO W/O SCOPE: CPT | Performed by: EMERGENCY MEDICINE

## 2022-04-28 PROCEDURE — 93010 ELECTROCARDIOGRAM REPORT: CPT | Performed by: INTERNAL MEDICINE

## 2022-04-28 PROCEDURE — 85025 COMPLETE CBC W/AUTO DIFF WBC: CPT | Performed by: EMERGENCY MEDICINE

## 2022-04-28 RX ORDER — SODIUM CHLORIDE 0.9 % (FLUSH) 0.9 %
10 SYRINGE (ML) INJECTION AS NEEDED
Status: DISCONTINUED | OUTPATIENT
Start: 2022-04-28 | End: 2022-04-28 | Stop reason: HOSPADM

## 2022-04-28 RX ADMIN — SODIUM CHLORIDE 1000 ML: 9 INJECTION, SOLUTION INTRAVENOUS at 06:57

## 2022-05-01 LAB — QT INTERVAL: 399 MS

## 2022-05-04 ENCOUNTER — ROUTINE PRENATAL (OUTPATIENT)
Dept: OBSTETRICS AND GYNECOLOGY | Facility: CLINIC | Age: 27
End: 2022-05-04

## 2022-05-04 VITALS — BODY MASS INDEX: 27.87 KG/M2 | SYSTOLIC BLOOD PRESSURE: 124 MMHG | WEIGHT: 152.4 LBS | DIASTOLIC BLOOD PRESSURE: 87 MMHG

## 2022-05-04 DIAGNOSIS — O34.219 PREVIOUS CESAREAN DELIVERY AFFECTING PREGNANCY: ICD-10-CM

## 2022-05-04 DIAGNOSIS — E10.9 TYPE 1 DIABETES MELLITUS WITHOUT COMPLICATION: ICD-10-CM

## 2022-05-04 DIAGNOSIS — O09.90 SUPERVISION OF HIGH RISK PREGNANCY, ANTEPARTUM: Primary | ICD-10-CM

## 2022-05-04 DIAGNOSIS — O24.319 PREGESTATIONAL DIABETES MELLITUS, MODIFIED WHITE CLASS B: ICD-10-CM

## 2022-05-04 DIAGNOSIS — D69.6 THROMBOCYTOPENIA: ICD-10-CM

## 2022-05-04 DIAGNOSIS — E66.3 OVERWEIGHT WITH BODY MASS INDEX (BMI) 25.0-29.9: ICD-10-CM

## 2022-05-04 LAB
GLUCOSE UR STRIP-MCNC: ABNORMAL MG/DL
PROT UR STRIP-MCNC: NEGATIVE MG/DL

## 2022-05-04 PROCEDURE — 99213 OFFICE O/P EST LOW 20 MIN: CPT | Performed by: STUDENT IN AN ORGANIZED HEALTH CARE EDUCATION/TRAINING PROGRAM

## 2022-05-04 NOTE — PROGRESS NOTES
OB FOLLOW UP  Complaint   Chief Complaint   Patient presents with   • Routine Prenatal Visit            Yamilet Dempsey is a 27 y.o.  12w1d patient being seen today for her obstetrical follow up visit. Patient denies fetal movement, contractions, loss of fluid or vaginal bleeding.  Tolerating oral intake.  Did have an event of hypoglycemia after insulin adjustments made requiring hospitalization.  Reports that insulin doses were halved by the hospitalist.  She has not followed up with MFM yet.  Compliant with prenatal vitamins.  Does have ketone test strips coming for monitoring at home.    Her prenatal care is complicated by (and status) :    Patient Active Problem List   Diagnosis   • Hepatosplenomegaly   • Overweight with body mass index (BMI) 25.0-29.9   • Thrombocytopenia (HCC)   • Type 1 diabetes mellitus (HCC)   • Supervision of high risk pregnancy, antepartum   • Previous  delivery affecting pregnancy   • Pregestational diabetes mellitus, modified White class B       All other systems reviewed and are negative.     The additional following portions of the patient's history were reviewed and updated as appropriate: allergies, current medications, past family history, past medical history, past social history, past surgical history and problem list.      EXAM:     Vital signs: /87   Wt 69.1 kg (152 lb 6.4 oz)   LMP 2022   BMI 27.87 kg/m²   Appearance/psychiatric: To be in no distress  Constitutional: The patient is well nourished.  Cardiovascular: She does not have edema.  Respiratory: Respiratory effort is normal.  Gastrointestinal: Abdomen is soft, gravid, nontender, no rashes, heart tones are present, fundal height is size equals dates    Pelvic Exam: No    Urine glucose/protein: See prenatal flowsheet       Assessment and Plan    Problem List Items Addressed This Visit        Coag and Thromboembolic    Thrombocytopenia (HCC)    Overview     Etiology is unclear.  Patient has  seen hematology/oncology with no formal diagnosis              Endocrine and Metabolic    Overweight with body mass index (BMI) 25.0-29.9    Overview     Last Assessment & Plan:   Formatting of this note might be different from the original.  Condition: stable    Educated patient on normal BMI range of 18.5 to 24.9    Advised to monitor nutrition to not exceed caloric needs, or as indicated by PCP in order to maintain a healthy weight and BMI.    Advised to engage in aerobic physical activity, if indicated to be safe by PCP, to assist with maintaining a healthy weight and BMI.     Advised to follow up with PCP to address nutrition as needed to assist with reaching or maintaining a healthy weight and BMI.     Follow up in: six months with PCP/Specialist.           Type 1 diabetes mellitus (HCC)    Overview     A1c on 2021 7               3/28/22 7.3    MFM settings     12 am 1.05  5 am 2.00  4 pm 1.75  9 pm 1.70    AIT  3 hours    Sensitivity   12 am 50    ICR  12 am 1:10   12 pm 1:8  4 pm 1:10    Target  12 am 100    .2022 patient mid to [facility] for hypoglycemic event which led to passing out.  She reports that her insulin doses were halved by the hospitalist at that point time.  She has not reached out to the maternal-fetal medicine specialist to update them in regards to this event.  She is planning to have a follow-up phone encounter next Thursday.  Recommendation for calling sooner to let him know that this occurred.             Relevant Medications    insulin lispro (humaLOG) 100 UNIT/ML injection    Pregestational diabetes mellitus, modified White class B    Overview     Insulin pump  MFM referral              Gravid and     Supervision of high risk pregnancy, antepartum - Primary    Overview     EDC: 11/15/2022    Declines prenatal genetic screening    Class B pregestational diabetes on an insulin pump  Prior  delivery with successful , desires  TOLAC  Thrombocytopenia    Tdap vaccine:  Flu vaccine: Recommended  COVID-19 vaccine: Recommended           Relevant Orders    POC Urinalysis Dipstick (Completed)    Previous  delivery affecting pregnancy    Overview     Low transverse  delivery at 28 weeks for twins  Successful  at 36 weeks with a carter  Desires TOLAC this pregnancy.  The risks, benefits, alternatives of been reviewed                 Impression  1. Pregnancy at 12w1d  2. Fetal status reassuring.   3. Activity and Exercise discussed.    Plan  1.  Recommendation for contacting maternal-fetal medicine with hypoglycemic event.   2.  Hydration discussed with patient; infectious precautions.   2.  Continue prenatal vitamin, follow-up 4 weeks.      Patient was counseled to the following pregnancy precautions:  • Vaginal bleeding can be normal in pregnancy, this usually takes a form of spotting.  If having heavier bleeding like a menstrual period please present for evaluation; especially in light of severe abdominal pain this could represent a placental abruption.  • Keep all scheduled appointments as recommended.        Igor Fierro MD  2022

## 2022-05-11 ENCOUNTER — TELEPHONE (OUTPATIENT)
Dept: OBSTETRICS AND GYNECOLOGY | Facility: CLINIC | Age: 27
End: 2022-05-11

## 2022-05-11 NOTE — TELEPHONE ENCOUNTER
Patient advised of Dr. Lebron's response. She states she will discuss with him further at her next appointment.

## 2022-05-11 NOTE — TELEPHONE ENCOUNTER
----- Message from Yamilet Dempsey sent at 5/11/2022  8:25 AM EDT -----  Regarding: Baby aspirin?  Hello,  The doctor at maternal fetal medicine is wanting me to start taking baby aspirin at 14 weeks. She said it was to prevent preeclampsia. I’m slightly confused as I always have low blood pressure even in my pregnancy’s and I have low platelets. I just don’t know if I feel comfortable doing this.     ALSO, I am moving a little past [place] in the next couple of weeks. I am planning. I’m still coming to [facility] for OB but I’m unsure about being able to go to [place] for maternal fetal medicine as it will be almost 2 hours away. I have asked to be seen in their [facility] office but they said I could not. Is there any other options? I’m not going to be able to go to [place] once I move.   Thank you  Yamilet

## 2022-05-31 ENCOUNTER — ROUTINE PRENATAL (OUTPATIENT)
Dept: OBSTETRICS AND GYNECOLOGY | Facility: CLINIC | Age: 27
End: 2022-05-31

## 2022-05-31 VITALS — SYSTOLIC BLOOD PRESSURE: 100 MMHG | BODY MASS INDEX: 27.84 KG/M2 | WEIGHT: 152.2 LBS | DIASTOLIC BLOOD PRESSURE: 69 MMHG

## 2022-05-31 DIAGNOSIS — O09.90 SUPERVISION OF HIGH RISK PREGNANCY, ANTEPARTUM: Primary | ICD-10-CM

## 2022-05-31 DIAGNOSIS — O24.319 PREGESTATIONAL DIABETES MELLITUS, MODIFIED WHITE CLASS B: ICD-10-CM

## 2022-05-31 DIAGNOSIS — O34.219 PREVIOUS CESAREAN DELIVERY AFFECTING PREGNANCY: ICD-10-CM

## 2022-05-31 DIAGNOSIS — D69.6 THROMBOCYTOPENIA: ICD-10-CM

## 2022-05-31 LAB
GLUCOSE UR STRIP-MCNC: NEGATIVE MG/DL
PROT UR STRIP-MCNC: NEGATIVE MG/DL

## 2022-05-31 PROCEDURE — 99214 OFFICE O/P EST MOD 30 MIN: CPT | Performed by: OBSTETRICS & GYNECOLOGY

## 2022-05-31 RX ORDER — BLOOD SUGAR DIAGNOSTIC
STRIP MISCELLANEOUS
COMMUNITY
Start: 2022-04-27

## 2022-05-31 RX ORDER — BLOOD SUGAR DIAGNOSTIC
STRIP MISCELLANEOUS
COMMUNITY
Start: 2022-05-04

## 2022-05-31 RX ORDER — LANCETS 30 GAUGE
EACH MISCELLANEOUS
COMMUNITY
Start: 2022-05-04

## 2022-05-31 RX ORDER — IBUPROFEN 600 MG/1
TABLET ORAL
COMMUNITY
Start: 2022-05-04

## 2022-05-31 RX ORDER — URINE ACETONE TEST STRIPS
STRIP MISCELLANEOUS
COMMUNITY
Start: 2022-04-27

## 2022-05-31 NOTE — PROGRESS NOTES
OB FOLLOW UP    CC: Scheduled OB routine FU     Prenatal care complicated by:   Patient Active Problem List   Diagnosis   • Hepatosplenomegaly   • Overweight with body mass index (BMI) 25.0-29.9   • Thrombocytopenia (HCC)   • Type 1 diabetes mellitus (HCC)   • Supervision of high risk pregnancy, antepartum   • Previous  delivery affecting pregnancy   • Pregestational diabetes mellitus, modified White class B       Subjective:   Patient has: No complaints, No leaking fluid, No vaginal bleeding, No contractions, Adequate FM    Objective:  Urine glucose/protein- see flow sheet      /69   Wt 69 kg (152 lb 3.2 oz)   LMP 2022   BMI 27.84 kg/m²   See OB flow for LE edema, and cvx exam if applicable  FHT: 134 BPM   Uterine Size: size equals dates      Assessment and Plan:  Diagnoses and all orders for this visit:    1. Supervision of high risk pregnancy, antepartum (Primary)  Overview:  EDC: 11/15/2022    Declines prenatal genetic screening    Class B pregestational diabetes on an insulin pump  Prior  delivery with successful , desires TOLAC  Thrombocytopenia    Tdap vaccine:  Flu vaccine: Recommended  COVID-19 vaccine: Recommended    Assessment & Plan:  Continue prenatal vitamins  Agree with [facility] recommendation of baby aspirin daily.  Anatomy ultrasound be done at [facility]'s office.    Orders:  -     POC Urinalysis Dipstick    2. Thrombocytopenia (HCC)  Overview:  Etiology is unclear.  Patient has seen hematology/oncology with no formal diagnosis    Assessment & Plan:  Continue to follow platelet level.  Check CBC next office visit      3. Pregestational diabetes mellitus, modified White class B  Overview:  Insulin pump  MFM referral    Assessment & Plan:  [facility] is primarily managing.  MFM consult note reviewed  Continue insulin pump per [facility] recommendations.  Continue to monitor blood sugars.  Check A1c next office visit.      4. Previous  delivery affecting pregnancy  Overview:  Low  transverse  delivery at 28 weeks for twins  Successful  at 36 weeks with a carter  Desires TOLAC this pregnancy.  The risks, benefits, alternatives of been reviewed          16w0d  Reassuring pregnancy progress    Counseling: Second trimester precautions  OB precautions, leaking, VB, armando mckeon vs PTL/Labor    Questions answered    Return in about 4 weeks (around 2022) for Recheck.      Edgar Lebron MD  2022

## 2022-05-31 NOTE — ASSESSMENT & PLAN NOTE
MFM is primarily managing.  MFM consult note reviewed  Continue insulin pump per MFM recommendations.  Continue to monitor blood sugars.  Check A1c next office visit.

## 2022-05-31 NOTE — ASSESSMENT & PLAN NOTE
[facility] is now managing the patient's insulin pump and diabetes.  MFM consult note has been reviewed.  Continue insulin pump per MFM recommendations.  Continue to monitor blood sugars.

## 2022-05-31 NOTE — ASSESSMENT & PLAN NOTE
Continue prenatal vitamins  Agree with [facility] recommendation of baby aspirin daily.  Anatomy ultrasound be done at [facility]'s office.

## 2022-06-07 ENCOUNTER — REFERRAL TRIAGE (OUTPATIENT)
Dept: LABOR AND DELIVERY | Facility: HOSPITAL | Age: 27
End: 2022-06-07

## 2022-06-21 ENCOUNTER — TELEPHONE (OUTPATIENT)
Dept: OBSTETRICS AND GYNECOLOGY | Facility: CLINIC | Age: 27
End: 2022-06-21

## 2022-06-21 NOTE — TELEPHONE ENCOUNTER
Patient called stating she has to cancel her [facility] appt on 06/23 due to her car being in the shop. She has not yet rescheduled that appointment but she is wanting to know if she can have her anatomy scan @ her next OV with you on 06/29. She is afraid she will not be able to get to [facility] before that visit. Please advise.

## 2022-06-21 NOTE — TELEPHONE ENCOUNTER
Spoke with pt and let her know there are not any ultrasound openings on 06/29, also let her know that  does not have any openings that week to change her appt day to correlate with an ultrasound. I let her know that she does need to still get her anatomy ultrasound @ [facility] as soon as she can. Advised she should keep her appt next week with  even if she does not get an ultrasound with it. Pt stated she isnt sure if she will be able to come to that appointment. I asked her to please try to keep that appointment but if we need to make any changes to call the office.

## 2022-07-05 ENCOUNTER — ROUTINE PRENATAL (OUTPATIENT)
Dept: OBSTETRICS AND GYNECOLOGY | Facility: CLINIC | Age: 27
End: 2022-07-05

## 2022-07-05 VITALS — SYSTOLIC BLOOD PRESSURE: 114 MMHG | WEIGHT: 153 LBS | BODY MASS INDEX: 27.98 KG/M2 | DIASTOLIC BLOOD PRESSURE: 64 MMHG

## 2022-07-05 DIAGNOSIS — O09.90 SUPERVISION OF HIGH RISK PREGNANCY, ANTEPARTUM: Primary | ICD-10-CM

## 2022-07-05 PROBLEM — Z87.59 HISTORY OF PRECIPITOUS DELIVERY: Status: ACTIVE | Noted: 2022-07-05

## 2022-07-05 PROBLEM — Z87.51 HISTORY OF PRETERM DELIVERY: Status: ACTIVE | Noted: 2022-07-05

## 2022-07-05 LAB
DEPRECATED RDW RBC AUTO: 41 FL (ref 37–54)
ERYTHROCYTE [DISTWIDTH] IN BLOOD BY AUTOMATED COUNT: 13.1 % (ref 12.3–15.4)
GLUCOSE UR STRIP-MCNC: ABNORMAL MG/DL
HCT VFR BLD AUTO: 33.7 % (ref 34–46.6)
HGB BLD-MCNC: 11.3 G/DL (ref 12–15.9)
MCH RBC QN AUTO: 29.2 PG (ref 26.6–33)
MCHC RBC AUTO-ENTMCNC: 33.5 G/DL (ref 31.5–35.7)
MCV RBC AUTO: 87.1 FL (ref 79–97)
PLATELET # BLD AUTO: 106 10*3/MM3 (ref 140–450)
PMV BLD AUTO: 11.9 FL (ref 6–12)
PROT UR STRIP-MCNC: NEGATIVE MG/DL
RBC # BLD AUTO: 3.87 10*6/MM3 (ref 3.77–5.28)
WBC NRBC COR # BLD: 8.78 10*3/MM3 (ref 3.4–10.8)

## 2022-07-05 PROCEDURE — 85027 COMPLETE CBC AUTOMATED: CPT | Performed by: OBSTETRICS & GYNECOLOGY

## 2022-07-05 PROCEDURE — 99214 OFFICE O/P EST MOD 30 MIN: CPT | Performed by: OBSTETRICS & GYNECOLOGY

## 2022-07-08 ENCOUNTER — PATIENT OUTREACH (OUTPATIENT)
Dept: LABOR AND DELIVERY | Facility: HOSPITAL | Age: 27
End: 2022-07-08

## 2022-07-08 NOTE — OUTREACH NOTE
[facility]  Enrollment    Current Estimated Gestational Age: 21w3d    Questions/Answers    Flowsheet Row Responses   Would like to participate? --  [undecided, will call back if interested.]            Lacey Purdy RN  Maternity Nurse Navigator    7/8/2022, 11:53 EDT

## 2022-07-12 ENCOUNTER — TELEPHONE (OUTPATIENT)
Dept: OBSTETRICS AND GYNECOLOGY | Facility: CLINIC | Age: 27
End: 2022-07-12

## 2022-07-12 NOTE — TELEPHONE ENCOUNTER
----- Message from Tara Phillips DO sent at 7/6/2022 11:47 AM EDT -----  Platelets are still low.  Pt needs hematology consult (and to discuss with MFM next OV), for a diagnosis, pregnancy related needs depending on the dx, and lastly determination for location of delivery since plt supply is limited at [place].  Does pt need another consult for Hematology or can she see who she has seen in the past?   Propranolol Pregnancy And Lactation Text: This medication is Pregnancy Category C and it isn't known if it is safe during pregnancy. It is excreted in breast milk.

## 2022-07-18 LAB — REF LAB TEST METHOD: NORMAL

## 2022-07-19 ENCOUNTER — ROUTINE PRENATAL (OUTPATIENT)
Dept: OBSTETRICS AND GYNECOLOGY | Facility: CLINIC | Age: 27
End: 2022-07-19

## 2022-07-19 VITALS — WEIGHT: 154 LBS | DIASTOLIC BLOOD PRESSURE: 80 MMHG | SYSTOLIC BLOOD PRESSURE: 131 MMHG | BODY MASS INDEX: 28.17 KG/M2

## 2022-07-19 DIAGNOSIS — O09.90 SUPERVISION OF HIGH RISK PREGNANCY, ANTEPARTUM: Primary | ICD-10-CM

## 2022-07-19 DIAGNOSIS — O24.319 PREGESTATIONAL DIABETES MELLITUS, MODIFIED WHITE CLASS B: ICD-10-CM

## 2022-07-19 DIAGNOSIS — Z87.51 HISTORY OF PRETERM DELIVERY: ICD-10-CM

## 2022-07-19 DIAGNOSIS — R16.2 HEPATOSPLENOMEGALY: ICD-10-CM

## 2022-07-19 DIAGNOSIS — D69.6 THROMBOCYTOPENIA: ICD-10-CM

## 2022-07-19 DIAGNOSIS — O34.219 PREVIOUS CESAREAN DELIVERY AFFECTING PREGNANCY: ICD-10-CM

## 2022-07-19 LAB
GLUCOSE UR STRIP-MCNC: ABNORMAL MG/DL
PROT UR STRIP-MCNC: ABNORMAL MG/DL

## 2022-07-19 PROCEDURE — 99214 OFFICE O/P EST MOD 30 MIN: CPT | Performed by: OBSTETRICS & GYNECOLOGY

## 2022-07-19 NOTE — PROGRESS NOTES
OB FOLLOW UP    CC: Scheduled OB routine FU     Prenatal care complicated by:   Patient Active Problem List   Diagnosis   • Hepatosplenomegaly   • Overweight with body mass index (BMI) 25.0-29.9   • Thrombocytopenia (HCC)   • Type 1 diabetes mellitus (HCC)   • Supervision of high risk pregnancy, antepartum   • Previous  delivery affecting pregnancy   • Pregestational diabetes mellitus, modified White class B   • History of  delivery   • History of precipitous delivery       Subjective:   Patient has: No complaints, No leaking fluid, No vaginal bleeding, No contractions, Adequate FM  The patient reports that she does not want to use 17 hydroxyprogesterone.  She is concerned about the number of visit she will have for the pregnancy due to her distance from the hospital.    Objective:  Urine glucose/protein- see flow sheet      /80   Wt 69.9 kg (154 lb)   LMP 2022   BMI 28.17 kg/m²   See OB flow for LE edema, and cvx exam if applicable  FHT: 155 BPM   Uterine Size: 23 cm    Ultrasound 2022: Cervical length 3.85 cm.  No cervical funneling.  [facility] consultation 2022 reviewed    Assessment and Plan:  Diagnoses and all orders for this visit:    1. Supervision of high risk pregnancy, antepartum (Primary)  Overview:  EDC: 11/15/2022 by LMP and 6week US    Genetic screening.  NIPS XX neg     History of  delivery x2  Type 1 diabetes with insulin pump  Hepatosplenomegaly  Thrombocytopenia -unclear etiology after heme-onc eval  Previous  delivery with successful     Tdap vaccine:  COVID-19 vaccine: 2022 recommended, declined  Flu vaccine:    [facility] , completed, wnl, ant placenta.  Recs fu growth 4weeks.       Assessment & Plan:  Continue prenatal vitamins  Fetal kick counts   labor warnings  No 1 hour Glucola will be needed.    Orders:  -     POC Urinalysis Dipstick    2. Pregestational diabetes mellitus, modified White class B  Overview:  Insulin pump  [facility]  referral    Assessment & Plan:  MF is managing the patient's diabetes and insulin pump.  Last MFM consultation reviewed.  Continue insulin therapy per MFM.  Continue MFM follow-up.  The patient has discussed her concerns about the amount of follow-up and her distance from both here and maternal-fetal medicine.  I discussed the patient that while I understand that follow-up can be difficult, she has a complicated and high risk pregnancy and we will try to make every combination while still surveilling the pregnancy adequately.      3. Previous  delivery affecting pregnancy  Overview:  Low transverse  delivery at 28 weeks for twins  Successful  at 36 weeks with a carter  Desires TOLAC this pregnancy.  The risks, benefits, alternatives of been reviewed      4. History of  delivery  Overview:  Declines 17 hydroxyprogesterone    Assessment & Plan:  I have had further long discussions regarding 17 hydroxyprogesterone and  birth prevention.  After reviewing the available data and recommendations from the [employer] of infectious and gynecology, the patient has decided to once again decline 17 hydroxyprogesterone.      5. Thrombocytopenia (HCC)  Overview:  Etiology is unclear.  Patient has seen hematology/oncology with no formal diagnosis    Assessment & Plan:  Repeat CBC next office visit      6. Hepatosplenomegaly  Overview:  Last Assessment & Plan:   Formatting of this note might be different from the original.  Condition: stable    Discussed with Yamilet the importance of management of chronic conditions. Discussed importance of adherence to all medical appointments. Member is followed by Hematology and oncology, Marcela Claros.   Follow up in: six months with PCP/Specialist.    Assessment & Plan:  Check CMP with next office visit          23w0d  Reassuring pregnancy progress    Counseling: OB precautions, leaking, VB, armando mckeon vs PTL/Labor  [facility]    Questions  answered    Return in about 3 weeks (around 8/9/2022) for Recheck.      Edgar Lebron MD  07/19/2022

## 2022-07-28 NOTE — ASSESSMENT & PLAN NOTE
I have had further long discussions regarding 17 hydroxyprogesterone and  birth prevention.  After reviewing the available data and recommendations from the [employer] of infectious and gynecology, the patient has decided to once again decline 17 hydroxyprogesterone.

## 2022-07-28 NOTE — ASSESSMENT & PLAN NOTE
MFM is managing the patient's diabetes and insulin pump.  Last MFM consultation reviewed.  Continue insulin therapy per MFM.  Continue MFM follow-up.  The patient has discussed her concerns about the amount of follow-up and her distance from both here and maternal-fetal medicine.  I discussed the patient that while I understand that follow-up can be difficult, she has a complicated and high risk pregnancy and we will try to make every combination while still surveilling the pregnancy adequately.

## 2022-08-10 ENCOUNTER — ROUTINE PRENATAL (OUTPATIENT)
Dept: OBSTETRICS AND GYNECOLOGY | Facility: CLINIC | Age: 27
End: 2022-08-10

## 2022-08-10 VITALS — WEIGHT: 157 LBS | BODY MASS INDEX: 28.72 KG/M2 | SYSTOLIC BLOOD PRESSURE: 122 MMHG | DIASTOLIC BLOOD PRESSURE: 80 MMHG

## 2022-08-10 DIAGNOSIS — E66.3 OVERWEIGHT WITH BODY MASS INDEX (BMI) 25.0-29.9: ICD-10-CM

## 2022-08-10 DIAGNOSIS — O34.219 PREVIOUS CESAREAN DELIVERY AFFECTING PREGNANCY: ICD-10-CM

## 2022-08-10 DIAGNOSIS — Z87.51 HISTORY OF PRETERM DELIVERY: ICD-10-CM

## 2022-08-10 DIAGNOSIS — D69.6 THROMBOCYTOPENIA: ICD-10-CM

## 2022-08-10 DIAGNOSIS — E10.9 TYPE 1 DIABETES MELLITUS WITHOUT COMPLICATION: ICD-10-CM

## 2022-08-10 DIAGNOSIS — O09.90 SUPERVISION OF HIGH RISK PREGNANCY, ANTEPARTUM: Primary | ICD-10-CM

## 2022-08-10 LAB
ALBUMIN SERPL-MCNC: 3.8 G/DL (ref 3.5–5.2)
ALBUMIN/GLOB SERPL: 1.4 G/DL
ALP SERPL-CCNC: 95 U/L (ref 39–117)
ALT SERPL W P-5'-P-CCNC: 11 U/L (ref 1–33)
ANION GAP SERPL CALCULATED.3IONS-SCNC: 11.2 MMOL/L (ref 5–15)
AST SERPL-CCNC: 21 U/L (ref 1–32)
BILIRUB SERPL-MCNC: <0.2 MG/DL (ref 0–1.2)
BUN SERPL-MCNC: 6 MG/DL (ref 6–20)
BUN/CREAT SERPL: 7.8 (ref 7–25)
CALCIUM SPEC-SCNC: 9.4 MG/DL (ref 8.6–10.5)
CHLORIDE SERPL-SCNC: 103 MMOL/L (ref 98–107)
CO2 SERPL-SCNC: 20.8 MMOL/L (ref 22–29)
CREAT SERPL-MCNC: 0.77 MG/DL (ref 0.57–1)
DEPRECATED RDW RBC AUTO: 41 FL (ref 37–54)
EGFRCR SERPLBLD CKD-EPI 2021: 107.2 ML/MIN/1.73
ERYTHROCYTE [DISTWIDTH] IN BLOOD BY AUTOMATED COUNT: 13.2 % (ref 12.3–15.4)
GLOBULIN UR ELPH-MCNC: 2.7 GM/DL
GLUCOSE SERPL-MCNC: 174 MG/DL (ref 65–99)
GLUCOSE UR STRIP-MCNC: NEGATIVE MG/DL
HCT VFR BLD AUTO: 33.4 % (ref 34–46.6)
HGB BLD-MCNC: 11.2 G/DL (ref 12–15.9)
MCH RBC QN AUTO: 28.5 PG (ref 26.6–33)
MCHC RBC AUTO-ENTMCNC: 33.5 G/DL (ref 31.5–35.7)
MCV RBC AUTO: 85 FL (ref 79–97)
PLATELET # BLD AUTO: 109 10*3/MM3 (ref 140–450)
PMV BLD AUTO: 11.6 FL (ref 6–12)
POTASSIUM SERPL-SCNC: 4.5 MMOL/L (ref 3.5–5.2)
PROT SERPL-MCNC: 6.5 G/DL (ref 6–8.5)
PROT UR STRIP-MCNC: NEGATIVE MG/DL
RBC # BLD AUTO: 3.93 10*6/MM3 (ref 3.77–5.28)
SODIUM SERPL-SCNC: 135 MMOL/L (ref 136–145)
WBC NRBC COR # BLD: 9.92 10*3/MM3 (ref 3.4–10.8)

## 2022-08-10 PROCEDURE — 80053 COMPREHEN METABOLIC PANEL: CPT | Performed by: STUDENT IN AN ORGANIZED HEALTH CARE EDUCATION/TRAINING PROGRAM

## 2022-08-10 PROCEDURE — 85027 COMPLETE CBC AUTOMATED: CPT | Performed by: STUDENT IN AN ORGANIZED HEALTH CARE EDUCATION/TRAINING PROGRAM

## 2022-08-10 PROCEDURE — 99214 OFFICE O/P EST MOD 30 MIN: CPT | Performed by: STUDENT IN AN ORGANIZED HEALTH CARE EDUCATION/TRAINING PROGRAM

## 2022-08-10 NOTE — PROGRESS NOTES
OB FOLLOW UP  Complaint   Chief Complaint   Patient presents with   • Routine Prenatal Visit            Yamilet Dempsey is a 27 y.o.  26w1d patient being seen today for her obstetrical follow up visit. Patient denies decreased fetal movement, contractions, loss of fluid or vaginal bleeding.  No acute complaints.  Seen by maternal-fetal medicine specialist.  Is having close follow-up with diabetes management as well as concerns for fetal growth restriction.    Her prenatal care is complicated by (and status) :    Patient Active Problem List   Diagnosis   • Hepatosplenomegaly   • Overweight with body mass index (BMI) 25.0-29.9   • Thrombocytopenia (HCC)   • Type 1 diabetes mellitus (HCC)   • Supervision of high risk pregnancy, antepartum   • Previous  delivery affecting pregnancy   • Pregestational diabetes mellitus, modified White class B   • History of  delivery   • History of precipitous delivery       All other systems reviewed and are negative.     The additional following portions of the patient's history were reviewed and updated as appropriate: allergies, current medications, past family history, past medical history, past social history, past surgical history and problem list.      EXAM:     Vital signs: /80   Wt 71.2 kg (157 lb)   LMP 2022   BMI 28.72 kg/m²   Appearance/psychiatric: To be in no distress  Constitutional: The patient is well nourished.  Cardiovascular: She does not have edema.  Respiratory: Respiratory effort is normal.  Gastrointestinal: Abdomen is soft, gravid, nontender, no rashes, heart tones are present, fundal height is size equals dates    Pelvic Exam: No    Urine glucose/protein: See prenatal flowsheet       Assessment and Plan    Problem List Items Addressed This Visit        Coag and Thromboembolic    Thrombocytopenia (HCC)    Overview     Etiology is unclear.  Patient has seen hematology/oncology with no formal diagnosis         Relevant Orders     CBC (No Diff)    Comprehensive Metabolic Panel       Endocrine and Metabolic    Overweight with body mass index (BMI) 25.0-29.9    Overview     Last Assessment & Plan:   Formatting of this note might be different from the original.  Condition: stable    Educated patient on normal BMI range of 18.5 to 24.9    Advised to monitor nutrition to not exceed caloric needs, or as indicated by PCP in order to maintain a healthy weight and BMI.    Advised to engage in aerobic physical activity, if indicated to be safe by PCP, to assist with maintaining a healthy weight and BMI.     Advised to follow up with PCP to address nutrition as needed to assist with reaching or maintaining a healthy weight and BMI.     Follow up in: six months with PCP/Specialist.         Type 1 diabetes mellitus (HCC)    Overview     A1c on 2021 7               3/28/22 7.3    MFM settings     12 am 1.05  5 am 2.00  4 pm 1.75  9 pm 1.70    AIT  3 hours    Sensitivity   12 am 50    ICR  12 am 1:10   12 pm 1:8  4 pm 1:10    Target  12 am 100    2022 patient admitted to [facility] for hypoglycemic event which led to passing out.  She reports that her insulin doses were halved by the hospitalist at that point time.  She has not reached out to the maternal-fetal medicine specialist to update them in regards to this event.  She is planning to have a follow-up phone encounter next Thursday.  Recommendation for calling sooner to let him know that this occurred.           Relevant Medications    insulin lispro (humaLOG) 100 UNIT/ML injection    Glucagon, rDNA, (Glucagon Emergency) 1 MG kit       Gravid and     Supervision of high risk pregnancy, antepartum - Primary    Overview     EDC: 11/15/2022 by LMP and 6week US    Genetic screening.  NIPS XX neg     History of  delivery x2  Type 1 diabetes with insulin pump  Hepatosplenomegaly  Thrombocytopenia -unclear etiology after heme-onc eval  Previous  delivery with  successful     Tdap vaccine:  COVID-19 vaccine: 2022 recommended, declined  Flu vaccine:    [facility] MFM , completed, wnl, ant placenta.  Recs fu growth 4weeks.            Relevant Orders    POC Urinalysis Dipstick (Completed)    Previous  delivery affecting pregnancy    Overview     Low transverse  delivery at 28 weeks for twins  Successful  at 36 weeks with a carter  Desires TOLAC this pregnancy.  The risks, benefits, alternatives of been reviewed         History of  delivery    Overview     Declines 17 hydroxyprogesterone               Impression  1. Pregnancy at 26w1d  2. Fetal status reassuring.   3. Activity and Exercise discussed.    Plan  1.  CBC and CMP for monitoring of thrombocytopenia and possible endorgan damage secondary to diabetes  2.  Follow-up 2 weeks  3.  Continue with [facility] comanagement for diabetes and potential growth restriction which is based off of the femur length of the posterior abdominal circumference      Patient was counseled to the following pregnancy precautions:  • Decreased fetal movement, if concern for decreased fetal movement please perform fetal kick counts you are looking for 10 movements in 2 hours.  If concern for fetal movement and not meeting that criteria, please present to triage for evaluation.  • Contractions occurring every 5 minutes for over an hour, lasting 30 to 60 seconds and progressively causing more discomfort, please seek medical attention to rule out labor  • If you believe that your water is broken, place a sanitary pad.  If pad fills in short period of time i.e. less than 5 minutes, take off pad placed another pad.  If this is saturated please present for rule out rupture of membranes  • Vaginal bleeding can be normal in pregnancy, this usually takes a form of spotting.  If having heavier bleeding like a menstrual period please present for evaluation; especially in light of severe abdominal pain this could represent a  placental abruption.  • Keep all scheduled appointments as recommended.        Igor Fierro MD  08/10/2022

## 2022-08-31 ENCOUNTER — ROUTINE PRENATAL (OUTPATIENT)
Dept: OBSTETRICS AND GYNECOLOGY | Facility: CLINIC | Age: 27
End: 2022-08-31

## 2022-08-31 VITALS — SYSTOLIC BLOOD PRESSURE: 108 MMHG | DIASTOLIC BLOOD PRESSURE: 74 MMHG | BODY MASS INDEX: 28.72 KG/M2 | WEIGHT: 157 LBS

## 2022-08-31 DIAGNOSIS — Z87.51 HISTORY OF PRETERM DELIVERY: ICD-10-CM

## 2022-08-31 DIAGNOSIS — Z87.59 HISTORY OF PRECIPITOUS DELIVERY: ICD-10-CM

## 2022-08-31 DIAGNOSIS — D69.6 THROMBOCYTOPENIA: ICD-10-CM

## 2022-08-31 DIAGNOSIS — O09.90 SUPERVISION OF HIGH RISK PREGNANCY, ANTEPARTUM: Primary | ICD-10-CM

## 2022-08-31 DIAGNOSIS — O24.319 PREGESTATIONAL DIABETES MELLITUS, MODIFIED WHITE CLASS B: ICD-10-CM

## 2022-08-31 DIAGNOSIS — O34.219 PREVIOUS CESAREAN DELIVERY AFFECTING PREGNANCY: ICD-10-CM

## 2022-08-31 LAB
GLUCOSE UR STRIP-MCNC: NEGATIVE MG/DL
PROT UR STRIP-MCNC: NEGATIVE MG/DL

## 2022-08-31 PROCEDURE — 99214 OFFICE O/P EST MOD 30 MIN: CPT | Performed by: OBSTETRICS & GYNECOLOGY

## 2022-09-01 NOTE — ASSESSMENT & PLAN NOTE
Continue insulin pump therapy per maternal-fetal medicine.  Most recent maternal-fetal medicine progress note and ultrasound reviewed.  Patient has an upcoming visit soon.

## 2022-09-15 ENCOUNTER — ROUTINE PRENATAL (OUTPATIENT)
Dept: OBSTETRICS AND GYNECOLOGY | Facility: CLINIC | Age: 27
End: 2022-09-15

## 2022-09-15 VITALS — BODY MASS INDEX: 29.7 KG/M2 | SYSTOLIC BLOOD PRESSURE: 125 MMHG | WEIGHT: 162.4 LBS | DIASTOLIC BLOOD PRESSURE: 75 MMHG

## 2022-09-15 DIAGNOSIS — O09.90 SUPERVISION OF HIGH RISK PREGNANCY, ANTEPARTUM: ICD-10-CM

## 2022-09-15 LAB
DEPRECATED RDW RBC AUTO: 43.2 FL (ref 37–54)
ERYTHROCYTE [DISTWIDTH] IN BLOOD BY AUTOMATED COUNT: 14 % (ref 12.3–15.4)
GLUCOSE UR STRIP-MCNC: ABNORMAL MG/DL
HCT VFR BLD AUTO: 31 % (ref 34–46.6)
HGB BLD-MCNC: 10.4 G/DL (ref 12–15.9)
MCH RBC QN AUTO: 28.4 PG (ref 26.6–33)
MCHC RBC AUTO-ENTMCNC: 33.5 G/DL (ref 31.5–35.7)
MCV RBC AUTO: 84.7 FL (ref 79–97)
PLATELET # BLD AUTO: 116 10*3/MM3 (ref 140–450)
PMV BLD AUTO: 11.4 FL (ref 6–12)
PROT UR STRIP-MCNC: ABNORMAL MG/DL
RBC # BLD AUTO: 3.66 10*6/MM3 (ref 3.77–5.28)
WBC NRBC COR # BLD: 9.13 10*3/MM3 (ref 3.4–10.8)

## 2022-09-15 PROCEDURE — 85027 COMPLETE CBC AUTOMATED: CPT | Performed by: OBSTETRICS & GYNECOLOGY

## 2022-09-15 PROCEDURE — 99214 OFFICE O/P EST MOD 30 MIN: CPT | Performed by: OBSTETRICS & GYNECOLOGY

## 2022-09-16 ENCOUNTER — TELEPHONE (OUTPATIENT)
Dept: OBSTETRICS AND GYNECOLOGY | Facility: CLINIC | Age: 27
End: 2022-09-16

## 2022-09-16 NOTE — TELEPHONE ENCOUNTER
----- Message from Celsa Ervin DO sent at 9/16/2022  9:57 AM EDT -----  Notify patient platelets stable. Recommend repeat with next office visit

## 2022-09-16 NOTE — PROGRESS NOTES
OB FOLLOW UP  CC- Here for care of pregnancy        Yamilet Dempsey is a 27 y.o.  31w3d patient being seen today for her obstetrical follow up visit. Patient reports no complaints.  Saw MFM yesterday and her insulin pump was changed and adjusted.    Her prenatal care is complicated by (and status) :    Patient Active Problem List   Diagnosis   • Hepatosplenomegaly   • Overweight with body mass index (BMI) 25.0-29.9   • Thrombocytopenia (HCC)   • Type 1 diabetes mellitus (HCC)   • Supervision of high risk pregnancy, antepartum   • Previous  delivery affecting pregnancy   • Pregestational diabetes mellitus, modified White class B   • History of  delivery   • History of precipitous delivery       Flu Status: Desires at future appt  Covid Status:    ROS -   Patient Reports : No Problems  Patient Denies: Loss of Fluid and Contractions  Fetal Movement : normal  All other systems reviewed and are negative.       The additional following portions of the patient's history were reviewed and updated as appropriate: allergies, current medications, past family history, past medical history, past social history, past surgical history and problem list.    I have reviewed and agree with the HPI, ROS, and historical information as entered above. Celsa Ervin, DO    /75   Wt 73.7 kg (162 lb 6.4 oz)   LMP 2022   BMI 29.70 kg/m²       EXAM:     FHT: 138 BPM   Uterine Size: 32 cm  Pelvic Exam: No    Urine glucose/protein: See prenatal flowsheet  MFM notes reviewed     Assessment and Plan  Diagnoses and all orders for this visit:    1. Supervision of high risk pregnancy, antepartum  Overview:  EDC: 11/15/2022 by LMP and 6week US    Genetic screening.  NIPS XX neg     History of  delivery x2  Type 1 diabetes with insulin pump  Hepatosplenomegaly  Thrombocytopenia -unclear etiology after heme-onc eval  Previous  delivery with successful     Tdap vaccine:  COVID-19 vaccine:  7/5/2022 recommended, declined  Flu vaccine:    [facility] 6/30, completed, wnl, ant placenta.  Recs fu growth 4weeks.       Orders:  -     POC Urinalysis Dipstick  -     CBC (No Diff); Future  -     CBC (No Diff)         1. Pregnancy at 31w3d  2. Fetal status reassuring.   3. Activity and Exercise discussed.  4. Return in about 2 weeks (around 9/29/2022) for rotate providers.    Celsa Ervin,   09/15/2022

## 2022-09-28 ENCOUNTER — ROUTINE PRENATAL (OUTPATIENT)
Dept: OBSTETRICS AND GYNECOLOGY | Facility: CLINIC | Age: 27
End: 2022-09-28

## 2022-09-28 VITALS — SYSTOLIC BLOOD PRESSURE: 108 MMHG | DIASTOLIC BLOOD PRESSURE: 68 MMHG | WEIGHT: 161 LBS | BODY MASS INDEX: 29.45 KG/M2

## 2022-09-28 DIAGNOSIS — O24.319 PREGESTATIONAL DIABETES MELLITUS, MODIFIED WHITE CLASS B: ICD-10-CM

## 2022-09-28 DIAGNOSIS — Z34.90 ENCOUNTER FOR SUPERVISION OF NORMAL PREGNANCY, ANTEPARTUM, UNSPECIFIED GRAVIDITY: Primary | ICD-10-CM

## 2022-09-28 LAB
GLUCOSE UR STRIP-MCNC: ABNORMAL MG/DL
PROT UR STRIP-MCNC: NEGATIVE MG/DL

## 2022-09-28 PROCEDURE — 99213 OFFICE O/P EST LOW 20 MIN: CPT | Performed by: OBSTETRICS & GYNECOLOGY

## 2022-09-28 NOTE — PROGRESS NOTES
Chief Complaint:  Scheduled OB visit    HPI: 27 y.o.  at 33w1d   Positive baby movement  Patient plans induction with Dr. Lebron secondary to diabetes.  Growth ultrasounds with MFM noted to be in the 40 percentile range per patient report.  Insulin pump managed per MFM    Vitals:    22 1020   BP: 108/68   Weight: 73 kg (161 lb)       See OB flowsheet also for pregnancy related data.    A/P  Intrauterine pregnancy at 33w1d   Diagnoses and all orders for this visit:    1. Encounter for supervision of normal pregnancy, antepartum, unspecified  (Primary)  -     POC Urinalysis Dipstick    2. Pregestational diabetes mellitus, modified White class B  Overview:  Insulin pump  MFM referral        Continue prenatal vitamins.  Encouraged fetal kick counts, 10 movements in 2 hours every day.  To labor and delivery if lack fetal movement    PLAN:   Return in about 2 weeks (around 10/12/2022).    Mio Payne Sr., MD  2022 10:41 EDT

## 2022-10-10 ENCOUNTER — ROUTINE PRENATAL (OUTPATIENT)
Dept: OBSTETRICS AND GYNECOLOGY | Facility: CLINIC | Age: 27
End: 2022-10-10

## 2022-10-10 VITALS — DIASTOLIC BLOOD PRESSURE: 75 MMHG | WEIGHT: 163 LBS | BODY MASS INDEX: 29.81 KG/M2 | SYSTOLIC BLOOD PRESSURE: 113 MMHG

## 2022-10-10 DIAGNOSIS — Z87.51 HISTORY OF PRETERM DELIVERY: ICD-10-CM

## 2022-10-10 DIAGNOSIS — Z34.90 ENCOUNTER FOR SUPERVISION OF NORMAL PREGNANCY, ANTEPARTUM, UNSPECIFIED GRAVIDITY: Primary | ICD-10-CM

## 2022-10-10 DIAGNOSIS — D69.6 THROMBOCYTOPENIA: ICD-10-CM

## 2022-10-10 DIAGNOSIS — O24.319 PREGESTATIONAL DIABETES MELLITUS, MODIFIED WHITE CLASS B: ICD-10-CM

## 2022-10-10 DIAGNOSIS — Z87.59 HISTORY OF PRECIPITOUS DELIVERY: ICD-10-CM

## 2022-10-10 DIAGNOSIS — O34.219 PREVIOUS CESAREAN DELIVERY AFFECTING PREGNANCY: ICD-10-CM

## 2022-10-10 DIAGNOSIS — O09.90 SUPERVISION OF HIGH RISK PREGNANCY, ANTEPARTUM: ICD-10-CM

## 2022-10-10 LAB
GLUCOSE UR STRIP-MCNC: NEGATIVE MG/DL
HBA1C MFR BLD: 6.9 % (ref 4.8–5.6)
PROT UR STRIP-MCNC: ABNORMAL MG/DL

## 2022-10-10 PROCEDURE — 83036 HEMOGLOBIN GLYCOSYLATED A1C: CPT | Performed by: OBSTETRICS & GYNECOLOGY

## 2022-10-10 PROCEDURE — 99214 OFFICE O/P EST MOD 30 MIN: CPT | Performed by: OBSTETRICS & GYNECOLOGY

## 2022-10-10 PROCEDURE — 85027 COMPLETE CBC AUTOMATED: CPT | Performed by: OBSTETRICS & GYNECOLOGY

## 2022-10-10 NOTE — ASSESSMENT & PLAN NOTE
Continue insulin therapy per [facility].  [facility] is primarily managing her pump.  The patient does have her settings for induction of labor and postpartum.  Check A1c today  Likely will need delivery between 37 and 38 weeks gestation

## 2022-10-10 NOTE — PROGRESS NOTES
OB FOLLOW UP    CC: Scheduled OB routine FU     Prenatal care complicated by:   Patient Active Problem List   Diagnosis   • Hepatosplenomegaly   • Overweight with body mass index (BMI) 25.0-29.9   • Thrombocytopenia (HCC)   • Type 1 diabetes mellitus (HCC)   • Supervision of high risk pregnancy, antepartum   • Previous  delivery affecting pregnancy   • Pregestational diabetes mellitus, modified White class B   • History of  delivery   • History of precipitous delivery       Subjective:   Patient has: No complaints, No leaking fluid, No vaginal bleeding, Adequate FM  Feeling some contractions    Objective:  Urine glucose/protein- see flow sheet      /75   Wt 73.9 kg (163 lb)   LMP 2022   BMI 29.81 kg/m²   See OB flow for LE edema, and cvx exam if applicable  FHT: 134 BPM   Uterine Size: 35 cm      Assessment and Plan:  Diagnoses and all orders for this visit:    1. Encounter for supervision of normal pregnancy, antepartum, unspecified  (Primary)  -     POC Urinalysis Dipstick    2. Supervision of high risk pregnancy, antepartum  Overview:  EDC: 11/15/2022 by LMP and 6week US    Genetic screening.  NIPS XX neg     History of  delivery x2  Type 1 diabetes with insulin pump  Hepatosplenomegaly  Thrombocytopenia -unclear etiology after heme-onc eval  Previous  delivery with successful     Tdap vaccine:  COVID-19 vaccine: 2022 recommended, declined  Flu vaccine:    [facility] , completed, wnl, ant placenta.  Recs fu growth 4weeks.       Assessment & Plan:  Continue prenatal vitamins  Fetal kick counts   labor warnings  GBS next office visit      3. Previous  delivery affecting pregnancy  Overview:  Low transverse  delivery at 28 weeks for twins  Successful  at 36 weeks with a carter  Desires TOLAC this pregnancy.  The risks, benefits, alternatives of been reviewed    Assessment & Plan:  The patient wishes to proceed with trial  labor after  birth      4. History of  delivery  Overview:  Declines 17 hydroxyprogesterone      5. Pregestational diabetes mellitus, modified White class B  Overview:  Insulin pump  [facility] referral    Assessment & Plan:  Continue insulin therapy per [facility].  [facility] is primarily managing her pump.  The patient does have her settings for induction of labor and postpartum.  Check A1c today  Likely will need delivery between 37 and 38 weeks gestation    Orders:  -     Hemoglobin A1c  -     Fetal Nonstress Test; Standing    6. Thrombocytopenia (HCC)  Overview:  Etiology is unclear.  Patient has seen hematology/oncology with no formal diagnosis    Assessment & Plan:  Repeat CBC today    Orders:  -     CBC (No Diff); Future  -     CBC (No Diff)    7. History of precipitous delivery        34w6d  Reassuring pregnancy progress    Counseling: OB precautions, leaking, VB, armando mckeon vs PTL/Labor  [facility]    Questions answered    Return in about 1 week (around 10/17/2022) for Recheck.      Edgar Lebron MD  10/10/2022

## 2022-10-11 LAB
DEPRECATED RDW RBC AUTO: 41.7 FL (ref 37–54)
ERYTHROCYTE [DISTWIDTH] IN BLOOD BY AUTOMATED COUNT: 14.3 % (ref 12.3–15.4)
HCT VFR BLD AUTO: 32.7 % (ref 34–46.6)
HGB BLD-MCNC: 10.7 G/DL (ref 12–15.9)
MCH RBC QN AUTO: 26.8 PG (ref 26.6–33)
MCHC RBC AUTO-ENTMCNC: 32.7 G/DL (ref 31.5–35.7)
MCV RBC AUTO: 82 FL (ref 79–97)
PLATELET # BLD AUTO: 99 10*3/MM3 (ref 140–450)
PMV BLD AUTO: 11.5 FL (ref 6–12)
RBC # BLD AUTO: 3.99 10*6/MM3 (ref 3.77–5.28)
WBC NRBC COR # BLD: 9.23 10*3/MM3 (ref 3.4–10.8)

## 2022-10-15 ENCOUNTER — HOSPITAL ENCOUNTER (OUTPATIENT)
Facility: HOSPITAL | Age: 27
Discharge: HOME | End: 2022-10-15
Attending: OBSTETRICS & GYNECOLOGY | Admitting: OBSTETRICS & GYNECOLOGY

## 2022-10-15 VITALS
DIASTOLIC BLOOD PRESSURE: 75 MMHG | BODY MASS INDEX: 28.88 KG/M2 | HEART RATE: 76 BPM | HEIGHT: 63 IN | WEIGHT: 163 LBS | SYSTOLIC BLOOD PRESSURE: 114 MMHG

## 2022-10-15 PROCEDURE — 59025 FETAL NON-STRESS TEST: CPT

## 2022-10-15 PROCEDURE — G0463 HOSPITAL OUTPT CLINIC VISIT: HCPCS

## 2022-10-15 NOTE — OBED NOTES
"  OBGYN Consult Note    Patient Name: Yamilet Dempsey           : 1995        MRN: [id number]  Primary Care Physician:  Provider, No Known    Referring Physician: Edgar Lebron MD  Date of admission: 10/15/2022      Subjective     Reason for Consult: labor assessment    HPI:  27 y.o.  35w4d  With insulin dependent Type 1 DM on insulin pump at \"about 100 u per day\" presents with complaints of contractions for several hours. She is concerned as her last delivery was  and precipitous and she lives distant from the hospital. She reports good FM , no loss of fluid or vaginal bleeding. She states that Dr Lebron has plans to induce soon. She was examined on admission and found to be 2cm 70% and -1 and her cervix is unchanged over 1.5 hours of monitoring. Her most recent glucose reading is 154.    Review of Systems: No leaking fluid, No vaginal bleeding and Adequate FM      Personal History     Past OB History: hx of C/S for twins followed by precipitous     Prenatal Labs:    External Prenatal Results     Pregnancy Outside Results - Transcribed From Office Records - See Scanned Records For Details     Test Value Date Time    ABO  A  22 1025    Rh  Positive  22 1025    Antibody Screen  Negative  22 1025    Varicella IgG       Rubella  4.95 index 22 1025    Hgb  10.7 g/dL 10/10/22 1152       10.4 g/dL 09/15/22 1525       11.2 g/dL 08/10/22 1401       11.3 g/dL 22 1413       12.0 g/dL 22 0556       13.4 g/dL 22 1025    Hct  32.7 % 10/10/22 1152       31.0 % 09/15/22 1525       33.4 % 08/10/22 1401       33.7 % 22 1413       35.0 % 22 0556       40.3 % 22 1025    Glucose Fasting GTT       Glucose Tolerance Test 1 hour       Glucose Tolerance Test 3 hour       Gonorrhea (discrete)  Negative  22 1015    Chlamydia (discrete)  Negative  22 1015    RPR  Non-Reactive  22 1025    VDRL       Syphilis Antibody       HBsAg  " Non-Reactive  22 1025    Herpes Simplex Virus PCR       Herpes Simplex VIrus Culture       HIV  Non-Reactive  22 1025    Hep C RNA Quant PCR       Hep C Antibody  Non-Reactive  22 1025    AFP       Group B Strep       GBS Susceptibility to Clindamycin       GBS Susceptibility to Erythromycin       Fetal Fibronectin       Genetic Testing, Maternal Blood             Drug Screening     Test Value Date Time    Urine Drug Screen       Amphetamine Screen       Barbiturate Screen       Benzodiazepine Screen       Methadone Screen       Phencyclidine Screen       Opiates Screen       THC Screen       Cocaine Screen       Propoxyphene Screen       Buprenorphine Screen       Methamphetamine Screen       Oxycodone Screen       Tricyclic Antidepressants Screen             Legend    ^: Historical                        PMHx:    Past Medical History:   Diagnosis Date   • Diabetes mellitus (HCC)        Home Medications:  Dexcom G6 Sensor, Dexcom G6 Transmitter, Glucagon Emergency, Insulin Syringe-Needle U-100, Omnipod DASH Pods (Gen 4), OneTouch Delica Plus [id number], Prenatal MV-Min-Fe Fum-FA-DHA, acetone (urine) test, folic acid, glucose blood, and insulin lispro    Allergies:  No Known Allergies    PSHx:   Past Surgical History:   Procedure Laterality Date   •  SECTION  2015    DR BROWN [place]       Social History:    reports that she has never smoked. She has never used smokeless tobacco. She reports that she does not drink alcohol and does not use drugs.    Family History: Non contributory    Immunizations: See prenatal record for Tdap, Flu, Covid and/or other vaccinations      Objective     Vitals: reviewed    Physical Exam   General- NAD, alert and oriented, appropriate  Psych- Normal mood, good memory  Abdomen- NABS, soft, non distended, non tender, no masses  Abdomen- Gravid, non tender  Fundus-  Size: consistent w dates.  Non tender.  Cvx- 2 /  / -1 and posterior  Presentation-  VTX  Ext/DTRs- Trace edema  Fetal HR: Category I  Contractions: Irregular      Brief Urine Lab Results  (Last result in the past 365 days)      Color   Clarity   Blood   Leuk Est   Nitrite   Protein   CREAT   Urine HCG        10/10/22 1120           Trace                 [unfilled]    [unfilled]    CBC    CBC 8/10/22 9/15/22 10/10/22   WBC 9.92 9.13 9.23   RBC 3.93 3.66 (A) 3.99   Hemoglobin 11.2 (A) 10.4 (A) 10.7 (A)   Hematocrit 33.4 (A) 31.0 (A) 32.7 (A)   MCV 85.0 84.7 82.0   MCH 28.5 28.4 26.8   MCHC 33.5 33.5 32.7   RDW 13.2 14.0 14.3   Platelets 109 (A) 116 (A) 99 (A)   (A) Abnormal value              CMP    CMP 3/28/22 4/28/22 8/10/22   Glucose 133 (A) 153 (A) 174 (A)   BUN 6 7 6   Creatinine 0.77 0.76 0.77   Sodium 137 135 (A) 135 (A)   Potassium 3.7 3.2 (A) 4.5   Chloride 103 102 103   Calcium 9.6 9.1 9.4   Albumin 4.90 4.10 3.80   Total Bilirubin 0.3 0.2 <0.2   Alkaline Phosphatase 74 63 95   AST (SGOT) 30 24 21   ALT (SGPT) 19 9 11   (A) Abnormal value                Assessment / Plan       Assessment:  35w 4d IUP  Type 1 IDDM  Previous C/S with subsequent   Irregular contractions, no cervical change, not in labor  Gestational thrombocytopenia      Plan:   Discharge  Continue current plan of care  Has Dr nunez and NST scheduled for Monday  Appropriate warnings and precautions discussed.      Electronically signed by Donald Littlejohn MD, 10/15/22, 9:15 AM EDT.

## 2022-10-15 NOTE — NURSING NOTE
Updated dr mendoza per telephone that patient presented to triage with complaints of  Contractions. Patient is a  with 3 living children with succesful prior tolac. Patient states pain is 8 out 10 with contractions. contracitons are irregular on monitor. fhr is reassuring for gestational age. sve is 2/70/-1. Dr mendoza ordered to recheck in 1 hour and would come to evaluate patient.

## 2022-10-15 NOTE — NON STRESS TEST
"Obstetrical Non-stress Test Interpretation     Name:  Yamilet Dempsey  MRN: [id number]    27 y.o. female  at 35w4d    Indication: contractions      Fetal Assessment  Fetal Movement: active  Fetal HR Assessment Method: external  Fetal HR (beats/min): 130  Fetal HR Baseline: normal range  Fetal HR Variability: moderate (amplitude range 6 to 25 bpm)  Fetal HR Accelerations: greater than/equal to 15 bpm, lasting at least 15 seconds  Fetal HR Decelerations: absent    /75   Pulse 76   Ht 160 cm (63\")   Wt 73.9 kg (163 lb)   LMP 2022   BMI 28.87 kg/m²     Reason for test: OB Triage  Date of Test: 10/15/2022  Time frame of test: 2405-5260  RN NST Interpretation: Radha Koehler  10/15/2022  09:42 EDT  "

## 2022-10-17 ENCOUNTER — HOSPITAL ENCOUNTER (OUTPATIENT)
Facility: HOSPITAL | Age: 27
Discharge: HOME | End: 2022-10-17
Attending: STUDENT IN AN ORGANIZED HEALTH CARE EDUCATION/TRAINING PROGRAM | Admitting: STUDENT IN AN ORGANIZED HEALTH CARE EDUCATION/TRAINING PROGRAM

## 2022-10-17 ENCOUNTER — ROUTINE PRENATAL (OUTPATIENT)
Dept: OBSTETRICS AND GYNECOLOGY | Facility: CLINIC | Age: 27
End: 2022-10-17

## 2022-10-17 ENCOUNTER — TELEPHONE (OUTPATIENT)
Dept: OBSTETRICS AND GYNECOLOGY | Facility: CLINIC | Age: 27
End: 2022-10-17

## 2022-10-17 ENCOUNTER — HOSPITAL ENCOUNTER (OUTPATIENT)
Dept: LABOR AND DELIVERY | Facility: HOSPITAL | Age: 27
Discharge: HOME | End: 2022-10-17
Admitting: OBSTETRICS & GYNECOLOGY

## 2022-10-17 VITALS — SYSTOLIC BLOOD PRESSURE: 108 MMHG | DIASTOLIC BLOOD PRESSURE: 75 MMHG | BODY MASS INDEX: 29.05 KG/M2 | WEIGHT: 164 LBS

## 2022-10-17 VITALS — DIASTOLIC BLOOD PRESSURE: 83 MMHG | HEART RATE: 77 BPM | RESPIRATION RATE: 16 BRPM | SYSTOLIC BLOOD PRESSURE: 117 MMHG

## 2022-10-17 DIAGNOSIS — O24.319 PREGESTATIONAL DIABETES MELLITUS, MODIFIED WHITE CLASS B: ICD-10-CM

## 2022-10-17 DIAGNOSIS — D69.6 THROMBOCYTOPENIA: ICD-10-CM

## 2022-10-17 DIAGNOSIS — O34.219 PREVIOUS CESAREAN DELIVERY AFFECTING PREGNANCY: ICD-10-CM

## 2022-10-17 DIAGNOSIS — Z87.51 HISTORY OF PRETERM DELIVERY: ICD-10-CM

## 2022-10-17 DIAGNOSIS — Z87.59 HISTORY OF PRECIPITOUS DELIVERY: ICD-10-CM

## 2022-10-17 DIAGNOSIS — Z34.90 ENCOUNTER FOR SUPERVISION OF NORMAL PREGNANCY, ANTEPARTUM, UNSPECIFIED GRAVIDITY: Primary | ICD-10-CM

## 2022-10-17 DIAGNOSIS — O09.90 SUPERVISION OF HIGH RISK PREGNANCY, ANTEPARTUM: ICD-10-CM

## 2022-10-17 LAB
GLUCOSE UR STRIP-MCNC: ABNORMAL MG/DL
PROT UR STRIP-MCNC: ABNORMAL MG/DL

## 2022-10-17 PROCEDURE — 59025 FETAL NON-STRESS TEST: CPT | Performed by: STUDENT IN AN ORGANIZED HEALTH CARE EDUCATION/TRAINING PROGRAM

## 2022-10-17 PROCEDURE — 99214 OFFICE O/P EST MOD 30 MIN: CPT | Performed by: OBSTETRICS & GYNECOLOGY

## 2022-10-17 PROCEDURE — 87081 CULTURE SCREEN ONLY: CPT | Performed by: OBSTETRICS & GYNECOLOGY

## 2022-10-17 PROCEDURE — 59025 FETAL NON-STRESS TEST: CPT

## 2022-10-17 NOTE — TELEPHONE ENCOUNTER
OK FOR HUB TO READ - LVM INDUCTION IS SCHEDULED WITH DR BAKER ON 10/25/22 @ 7:00 AM EST / HER OFFICE VISIT THAT WAS ON 10/19/22 WITH DR. JHAVERI HAS BEEN RESCHEDULED TO DR. BAKER ON 10/24/22 @ 10:45 AM EST

## 2022-10-17 NOTE — ASSESSMENT & PLAN NOTE
The patient continues to desire to have a trial of labor after  delivery.  The risks, benefits, alternatives have been discussed including the risk of uterine rupture and permanent fetal injury including permanent fetal brain injury such a cerebral palsy and fetal death.  We have also discussed the risk of the need for repeat  birth due to standard obstetrical indications.

## 2022-10-17 NOTE — ASSESSMENT & PLAN NOTE
Continue insulin pump therapy per MFM recommendations.  I reviewed the plan for insulin pump basal reductions the night prior to delivery.  MFM is recommended delivery between 37 and 38 weeks gestation.  Continue NSTs.

## 2022-10-17 NOTE — NON STRESS TEST
Obstetrical Non-stress Test Interpretation     Name:  Yamilet Dempsey  MRN: [id number]    27 y.o. female  at 35w6d    Indication: Diabetes       Fetal Movement: active  Fetal HR Assessment Method: external  Fetal HR (beats/min): 135  Fetal HR Baseline: normal range  Fetal HR Variability: moderate (amplitude range 6 to 25 bpm)  Fetal HR Accelerations: episodic, greater than/equal to 15 bpm, lasting at least 15 seconds  Fetal HR Decelerations: absent  Sinusoidal Pattern Present: absent  Fetal HR Tracing Category: Category I    /83 (BP Location: Right arm, Patient Position: Sitting)   Pulse 77   Resp 16   LMP 2022     Reason for test: Diabetes  Date of Test: 10/17/2022  Time frame of test:   RN NST Interpretation: Reactive      Lanette Arias RN  10/17/2022  09:12 EDT

## 2022-10-17 NOTE — PROGRESS NOTES
OB FOLLOW UP    CC: Scheduled OB routine FU     Prenatal care complicated by:   Patient Active Problem List   Diagnosis   • Hepatosplenomegaly   • Overweight with body mass index (BMI) 25.0-29.9   • Thrombocytopenia (HCC)   • Type 1 diabetes mellitus (HCC)   • Supervision of high risk pregnancy, antepartum   • Previous  delivery affecting pregnancy   • Pregestational diabetes mellitus, modified White class B   • History of  delivery   • History of precipitous delivery       Subjective:   Patient has: No complaints, No leaking fluid, No vaginal bleeding, Adequate FM  The patient reports she is having increased contractions.  The patient states she was seen in triage over the weekend and was 2 cm dilated.     Objective:  Urine glucose/protein- see flow sheet      /75   Wt 74.4 kg (164 lb)   LMP 2022   BMI 29.05 kg/m²   See OB flow for LE edema, and cvx exam if applicable  FHT: 135 BPM   Uterine Size: 37 cm      Assessment and Plan:  Diagnoses and all orders for this visit:    1. Encounter for supervision of normal pregnancy, antepartum, unspecified  (Primary)  -     POC Urinalysis Dipstick  -     Group B Streptococcus Culture - Swab, Vaginal/Rectum    2. Pregestational diabetes mellitus, modified White class B  Overview:  Insulin pump  MFM referral    Assessment & Plan:  Continue insulin pump therapy per MFM recommendations.  I reviewed the plan for insulin pump basal reductions the night prior to delivery.  MFM is recommended delivery between 37 and 38 weeks gestation.  Continue NSTs.      3. Supervision of high risk pregnancy, antepartum  Overview:  EDC: 11/15/2022 by LMP and 6week US    Genetic screening.  NIPS XX neg     History of  delivery x2  Type 1 diabetes with insulin pump  Hepatosplenomegaly  Thrombocytopenia -unclear etiology after heme-onc eval  Previous  delivery with successful     Tdap vaccine:  COVID-19 vaccine: 2022 recommended,  declined  Flu vaccine:    [facility] , completed, wnl, ant placenta.  Recs fu growth 4weeks.       Assessment & Plan:  GBS collected  Continue prenatal vitamins  Fetal kick counts  Labor instructions      4. Previous  delivery affecting pregnancy  Overview:  Low transverse  delivery at 28 weeks for twins  Successful  at 36 weeks with a carter  Desires TOLAC this pregnancy.  The risks, benefits, alternatives of been reviewed    Assessment & Plan:  The patient continues to desire to have a trial of labor after  delivery.  The risks, benefits, alternatives have been discussed including the risk of uterine rupture and permanent fetal injury including permanent fetal brain injury such a cerebral palsy and fetal death.  We have also discussed the risk of the need for repeat  birth due to standard obstetrical indications.      5. History of  delivery  Overview:  Declines 17 hydroxyprogesterone    Assessment & Plan:   labor warnings      6. History of precipitous delivery    7. Thrombocytopenia (HCC)  Overview:  Etiology is unclear.  Patient has seen hematology/oncology with no formal diagnosis    Assessment & Plan:  Platelets are stable around 99,000          35w6d  Reassuring pregnancy progress    Counseling: OB precautions, leaking, VB, armando mckeon vs PTL/Labor  FKC    Questions answered    Return in about 1 week (around 10/24/2022) for Recheck.      Edgar Lebron MD  10/17/2022

## 2022-10-19 LAB — BACTERIA SPEC AEROBE CULT: NORMAL

## 2022-10-22 NOTE — PROGRESS NOTES
Routine Prenatal Visit     Subjective  Yamilet Dempsey is a 27 y.o.  at 36w4d here for her routine OB visit.   She is taking her prenatal vitamins.Reports no loss of fluid or vaginal bleeding. Patient doing well without any complaints. Pregnancy complicated by:     Patient Active Problem List   Diagnosis   • Hepatosplenomegaly   • Overweight with body mass index (BMI) 25.0-29.9   • Thrombocytopenia (HCC)   • Type 1 diabetes mellitus (HCC)   • Supervision of high risk pregnancy, antepartum   • Previous  delivery affecting pregnancy   • Pregestational diabetes mellitus, modified White class B   • History of  delivery   • History of precipitous delivery         OB History    Para Term  AB Living   5 2   2 2 3   SAB IAB Ectopic Molar Multiple Live Births   2       1 3      # Outcome Date GA Lbr Nigel/2nd Weight Sex Delivery Anes PTL Lv   5 Current            4 2020     Spon misscar      3 2019     Spon misscar      2  16 36w0d   M Vaginal unsp   AILYN   1A  02/22/15 28w0d   M CS-Unspec   AILYN   1B  02/22/15 28w0d   F CS-Unspec   AILYN           ROS:   General ROS: negative for - chills or fatigue  Respiratory ROS: negative for - cough or hemoptysis  Cardiovascular ROS: negative for - chest pain or dyspnea on exertion  Genito-Urinary ROS: negative for  change in urinary stream, vaginal discharge   Musculoskeletal ROS: negative for - gait disturbance or joint pain  Dermatological ROS: negative for acne,  dry skin or itching    Objective  Physical Exam:   Vitals:    10/24/22 1035   BP: 121/84       FHT: 130    General appearance - alert, well appearing, and in no distress  Mental status - alert, oriented to person, place, and time  Abdomen- Soft, Gravid uterus, non-tender to palpation  Musculoskeletal: negative for - gait disturbance or joint pain  Extremeties: negative swelling or cyanosis   Dermatological: negative rashes or skin lesions   Pelvic exam:  2/50/-3    Assessment/Plan:   Diagnoses and all orders for this visit:    1. Supervision of high risk pregnancy, antepartum (Primary)  -     POC Urinalysis Dipstick    2. History of precipitous delivery    3. Type 1 diabetes mellitus without complication (HCC)    4. Previous  delivery affecting pregnancy    5. Thrombocytopenia (HCC)    6. Desires  (vaginal birth after ) trial            Counseling:   OB precautions, leaking, VB, armando mckeon vs PTL/Labor  HTN precautions, HA, vision change, RUQ/epigastric pain, edema  Round Ligament Pain:  The uterus has several ligaments which provide support and keep the uterus in place. As the  uterus grows these ligaments are pulled and stretched which often causes sharp stabbing like pain in the inguinal area.   You may find a pregnancy support band helpful. Changing positions may also help. Yoga is a great way to cope with round ligament and low back pain in pregnancy.    Massage may also help with low back pain   Things to Consider at this Point in your Pregnancy:  Some women experience swelling in their feet during pregnancy. Compression stockings may help  Drink plenty of water and stay active   Make sure you are eating frequent small meals, nuts are a wonderful snack to keep with you            Return in about 7 weeks (around 2022) for Postpartum visit.      We have gone over prenatal care to include the timing and content of visits. I informed her how to contact the office and/or on call person in the event of any problems and encouraged her to do so when she feels it is necessary.  We then spent time answering her questions which she indicated were answered to her satisfaction.    Doris Herbert DO  10/24/2022 10:56 EDT

## 2022-10-24 ENCOUNTER — ROUTINE PRENATAL (OUTPATIENT)
Dept: OBSTETRICS AND GYNECOLOGY | Facility: CLINIC | Age: 27
End: 2022-10-24

## 2022-10-24 VITALS — BODY MASS INDEX: 29.16 KG/M2 | WEIGHT: 164.6 LBS | DIASTOLIC BLOOD PRESSURE: 84 MMHG | SYSTOLIC BLOOD PRESSURE: 121 MMHG

## 2022-10-24 DIAGNOSIS — D69.6 THROMBOCYTOPENIA: ICD-10-CM

## 2022-10-24 DIAGNOSIS — O34.219 DESIRES VBAC (VAGINAL BIRTH AFTER CESAREAN) TRIAL: ICD-10-CM

## 2022-10-24 DIAGNOSIS — E10.9 TYPE 1 DIABETES MELLITUS WITHOUT COMPLICATION: ICD-10-CM

## 2022-10-24 DIAGNOSIS — O09.90 SUPERVISION OF HIGH RISK PREGNANCY, ANTEPARTUM: Primary | ICD-10-CM

## 2022-10-24 DIAGNOSIS — Z87.59 HISTORY OF PRECIPITOUS DELIVERY: ICD-10-CM

## 2022-10-24 DIAGNOSIS — O34.219 PREVIOUS CESAREAN DELIVERY AFFECTING PREGNANCY: ICD-10-CM

## 2022-10-24 LAB
GLUCOSE UR STRIP-MCNC: NEGATIVE MG/DL
PROT UR STRIP-MCNC: NEGATIVE MG/DL

## 2022-10-24 PROCEDURE — 99212 OFFICE O/P EST SF 10 MIN: CPT | Performed by: OBSTETRICS & GYNECOLOGY

## 2022-10-25 ENCOUNTER — HOSPITAL ENCOUNTER (INPATIENT)
Facility: HOSPITAL | Age: 27
LOS: 2 days | Discharge: HOME | End: 2022-10-27
Attending: OBSTETRICS & GYNECOLOGY | Admitting: OBSTETRICS & GYNECOLOGY

## 2022-10-25 ENCOUNTER — ANESTHESIA EVENT (OUTPATIENT)
Dept: LABOR AND DELIVERY | Facility: HOSPITAL | Age: 27
End: 2022-10-25

## 2022-10-25 ENCOUNTER — ANESTHESIA (OUTPATIENT)
Dept: LABOR AND DELIVERY | Facility: HOSPITAL | Age: 27
End: 2022-10-25

## 2022-10-25 ENCOUNTER — HOSPITAL ENCOUNTER (OUTPATIENT)
Dept: LABOR AND DELIVERY | Facility: HOSPITAL | Age: 27
Discharge: HOME | End: 2022-10-25

## 2022-10-25 PROBLEM — Z3A.37 37 WEEKS GESTATION OF PREGNANCY: Status: ACTIVE | Noted: 2022-10-25

## 2022-10-25 PROBLEM — Z37.9 NORMAL LABOR: Status: ACTIVE | Noted: 2022-10-25

## 2022-10-25 LAB
ABO GROUP BLD: NORMAL
ALBUMIN SERPL-MCNC: 4.2 G/DL (ref 3.5–5.2)
ALBUMIN/GLOB SERPL: 1.1 G/DL
ALP SERPL-CCNC: 191 U/L (ref 39–117)
ALT SERPL W P-5'-P-CCNC: 11 U/L (ref 1–33)
ANION GAP SERPL CALCULATED.3IONS-SCNC: 14.6 MMOL/L (ref 5–15)
APTT PPP: 25.3 SECONDS (ref 24.2–34.2)
AST SERPL-CCNC: 34 U/L (ref 1–32)
BILIRUB SERPL-MCNC: 0.4 MG/DL (ref 0–1.2)
BLD GP AB SCN SERPL QL: NEGATIVE
BUN SERPL-MCNC: 6 MG/DL (ref 6–20)
BUN/CREAT SERPL: 7.7 (ref 7–25)
CALCIUM SPEC-SCNC: 9.5 MG/DL (ref 8.6–10.5)
CHLORIDE SERPL-SCNC: 98 MMOL/L (ref 98–107)
CO2 SERPL-SCNC: 20.4 MMOL/L (ref 22–29)
CREAT SERPL-MCNC: 0.78 MG/DL (ref 0.57–1)
DEPRECATED RDW RBC AUTO: 41.4 FL (ref 37–54)
EGFRCR SERPLBLD CKD-EPI 2021: 105.6 ML/MIN/1.73
ERYTHROCYTE [DISTWIDTH] IN BLOOD BY AUTOMATED COUNT: 14.4 % (ref 12.3–15.4)
FIBRINOGEN PPP-MCNC: 332 MG/DL (ref 215–521)
GLOBULIN UR ELPH-MCNC: 3.8 GM/DL
GLUCOSE SERPL-MCNC: 187 MG/DL (ref 65–99)
HCT VFR BLD AUTO: 36.9 % (ref 34–46.6)
HGB BLD-MCNC: 12.5 G/DL (ref 12–15.9)
INR PPP: 0.99 (ref 0.86–1.15)
MCH RBC QN AUTO: 27 PG (ref 26.6–33)
MCHC RBC AUTO-ENTMCNC: 33.9 G/DL (ref 31.5–35.7)
MCV RBC AUTO: 79.7 FL (ref 79–97)
PLATELET # BLD AUTO: 126 10*3/MM3 (ref 140–450)
PMV BLD AUTO: 12 FL (ref 6–12)
POTASSIUM SERPL-SCNC: 4.1 MMOL/L (ref 3.5–5.2)
PROT SERPL-MCNC: 8 G/DL (ref 6–8.5)
PROTHROMBIN TIME: 13.2 SECONDS (ref 11.8–14.9)
RBC # BLD AUTO: 4.63 10*6/MM3 (ref 3.77–5.28)
RH BLD: POSITIVE
SODIUM SERPL-SCNC: 133 MMOL/L (ref 136–145)
T&S EXPIRATION DATE: NORMAL
WBC NRBC COR # BLD: 10.12 10*3/MM3 (ref 3.4–10.8)

## 2022-10-25 PROCEDURE — 86900 BLOOD TYPING SEROLOGIC ABO: CPT | Performed by: OBSTETRICS & GYNECOLOGY

## 2022-10-25 PROCEDURE — 51702 INSERT TEMP BLADDER CATH: CPT

## 2022-10-25 PROCEDURE — 0UQMXZZ REPAIR VULVA, EXTERNAL APPROACH: ICD-10-PCS | Performed by: OBSTETRICS & GYNECOLOGY

## 2022-10-25 PROCEDURE — 10907ZC DRAINAGE OF AMNIOTIC FLUID, THERAPEUTIC FROM PRODUCTS OF CONCEPTION, VIA NATURAL OR ARTIFICIAL OPENING: ICD-10-PCS | Performed by: OBSTETRICS & GYNECOLOGY

## 2022-10-25 PROCEDURE — 85730 THROMBOPLASTIN TIME PARTIAL: CPT | Performed by: OBSTETRICS & GYNECOLOGY

## 2022-10-25 PROCEDURE — 85384 FIBRINOGEN ACTIVITY: CPT | Performed by: OBSTETRICS & GYNECOLOGY

## 2022-10-25 PROCEDURE — 25010000002 FENTANYL CITRATE (PF) 50 MCG/ML SOLUTION: Performed by: ANESTHESIOLOGY

## 2022-10-25 PROCEDURE — 85027 COMPLETE CBC AUTOMATED: CPT | Performed by: OBSTETRICS & GYNECOLOGY

## 2022-10-25 PROCEDURE — 86850 RBC ANTIBODY SCREEN: CPT | Performed by: OBSTETRICS & GYNECOLOGY

## 2022-10-25 PROCEDURE — C1755 CATHETER, INTRASPINAL: HCPCS | Performed by: ANESTHESIOLOGY

## 2022-10-25 PROCEDURE — 0KQM0ZZ REPAIR PERINEUM MUSCLE, OPEN APPROACH: ICD-10-PCS | Performed by: OBSTETRICS & GYNECOLOGY

## 2022-10-25 PROCEDURE — 80053 COMPREHEN METABOLIC PANEL: CPT | Performed by: OBSTETRICS & GYNECOLOGY

## 2022-10-25 PROCEDURE — 85610 PROTHROMBIN TIME: CPT | Performed by: OBSTETRICS & GYNECOLOGY

## 2022-10-25 PROCEDURE — 25010000002 OXYTOCIN PER 10 UNITS: Performed by: OBSTETRICS & GYNECOLOGY

## 2022-10-25 PROCEDURE — 86901 BLOOD TYPING SEROLOGIC RH(D): CPT | Performed by: OBSTETRICS & GYNECOLOGY

## 2022-10-25 RX ORDER — ONDANSETRON 4 MG/1
4 TABLET, FILM COATED ORAL EVERY 6 HOURS PRN
Status: DISCONTINUED | OUTPATIENT
Start: 2022-10-25 | End: 2022-10-26 | Stop reason: HOSPADM

## 2022-10-25 RX ORDER — TRISODIUM CITRATE DIHYDRATE AND CITRIC ACID MONOHYDRATE 500; 334 MG/5ML; MG/5ML
30 SOLUTION ORAL ONCE AS NEEDED
Status: DISCONTINUED | OUTPATIENT
Start: 2022-10-25 | End: 2022-10-26 | Stop reason: HOSPADM

## 2022-10-25 RX ORDER — FENTANYL CITRATE 50 UG/ML
INJECTION, SOLUTION INTRAMUSCULAR; INTRAVENOUS
Status: COMPLETED
Start: 2022-10-25 | End: 2022-10-25

## 2022-10-25 RX ORDER — ONDANSETRON 2 MG/ML
4 INJECTION INTRAMUSCULAR; INTRAVENOUS EVERY 6 HOURS PRN
Status: DISCONTINUED | OUTPATIENT
Start: 2022-10-25 | End: 2022-10-26 | Stop reason: HOSPADM

## 2022-10-25 RX ORDER — METHYLERGONOVINE MALEATE 0.2 MG/ML
200 INJECTION INTRAVENOUS ONCE AS NEEDED
Status: DISCONTINUED | OUTPATIENT
Start: 2022-10-25 | End: 2022-10-26 | Stop reason: HOSPADM

## 2022-10-25 RX ORDER — SODIUM CHLORIDE 0.9 % (FLUSH) 0.9 %
3 SYRINGE (ML) INJECTION EVERY 12 HOURS SCHEDULED
Status: DISCONTINUED | OUTPATIENT
Start: 2022-10-25 | End: 2022-10-26 | Stop reason: HOSPADM

## 2022-10-25 RX ORDER — TERBUTALINE SULFATE 1 MG/ML
0.25 INJECTION, SOLUTION SUBCUTANEOUS AS NEEDED
Status: DISCONTINUED | OUTPATIENT
Start: 2022-10-25 | End: 2022-10-26 | Stop reason: HOSPADM

## 2022-10-25 RX ORDER — DEXTROSE MONOHYDRATE 25 G/50ML
25 INJECTION, SOLUTION INTRAVENOUS
Status: DISCONTINUED | OUTPATIENT
Start: 2022-10-25 | End: 2022-10-26

## 2022-10-25 RX ORDER — MAGNESIUM CARB/ALUMINUM HYDROX 105-160MG
30 TABLET,CHEWABLE ORAL ONCE
Status: DISCONTINUED | OUTPATIENT
Start: 2022-10-25 | End: 2022-10-26 | Stop reason: HOSPADM

## 2022-10-25 RX ORDER — CARBOPROST TROMETHAMINE 250 UG/ML
250 INJECTION, SOLUTION INTRAMUSCULAR AS NEEDED
Status: DISCONTINUED | OUTPATIENT
Start: 2022-10-25 | End: 2022-10-26 | Stop reason: HOSPADM

## 2022-10-25 RX ORDER — SODIUM CHLORIDE, SODIUM LACTATE, POTASSIUM CHLORIDE, CALCIUM CHLORIDE 600; 310; 30; 20 MG/100ML; MG/100ML; MG/100ML; MG/100ML
125 INJECTION, SOLUTION INTRAVENOUS CONTINUOUS
Status: DISCONTINUED | OUTPATIENT
Start: 2022-10-25 | End: 2022-10-26

## 2022-10-25 RX ORDER — IBUPROFEN 600 MG/1
600 TABLET ORAL EVERY 6 HOURS PRN
Status: DISCONTINUED | OUTPATIENT
Start: 2022-10-25 | End: 2022-10-26 | Stop reason: HOSPADM

## 2022-10-25 RX ORDER — ACETAMINOPHEN 325 MG/1
650 TABLET ORAL EVERY 4 HOURS PRN
Status: DISCONTINUED | OUTPATIENT
Start: 2022-10-25 | End: 2022-10-26 | Stop reason: HOSPADM

## 2022-10-25 RX ORDER — NICOTINE POLACRILEX 4 MG
15 LOZENGE BUCCAL
Status: DISCONTINUED | OUTPATIENT
Start: 2022-10-25 | End: 2022-10-26

## 2022-10-25 RX ORDER — LIDOCAINE HYDROCHLORIDE AND EPINEPHRINE 15; 5 MG/ML; UG/ML
INJECTION, SOLUTION EPIDURAL
Status: COMPLETED | OUTPATIENT
Start: 2022-10-25 | End: 2022-10-25

## 2022-10-25 RX ORDER — OXYTOCIN/0.9 % SODIUM CHLORIDE 30/500 ML
2 PLASTIC BAG, INJECTION (ML) INTRAVENOUS
Status: DISCONTINUED | OUTPATIENT
Start: 2022-10-25 | End: 2022-10-26

## 2022-10-25 RX ORDER — MISOPROSTOL 200 UG/1
800 TABLET ORAL AS NEEDED
Status: DISCONTINUED | OUTPATIENT
Start: 2022-10-25 | End: 2022-10-26 | Stop reason: HOSPADM

## 2022-10-25 RX ORDER — EPHEDRINE SULFATE 50 MG/ML
5 INJECTION, SOLUTION INTRAVENOUS
Status: DISCONTINUED | OUTPATIENT
Start: 2022-10-25 | End: 2022-10-26 | Stop reason: HOSPADM

## 2022-10-25 RX ORDER — SODIUM CHLORIDE 0.9 % (FLUSH) 0.9 %
10 SYRINGE (ML) INJECTION AS NEEDED
Status: DISCONTINUED | OUTPATIENT
Start: 2022-10-25 | End: 2022-10-26 | Stop reason: HOSPADM

## 2022-10-25 RX ORDER — FENTANYL CITRATE 50 UG/ML
INJECTION, SOLUTION INTRAMUSCULAR; INTRAVENOUS
Status: COMPLETED | OUTPATIENT
Start: 2022-10-25 | End: 2022-10-25

## 2022-10-25 RX ORDER — LIDOCAINE HYDROCHLORIDE 10 MG/ML
5 INJECTION, SOLUTION EPIDURAL; INFILTRATION; INTRACAUDAL; PERINEURAL AS NEEDED
Status: DISCONTINUED | OUTPATIENT
Start: 2022-10-25 | End: 2022-10-26 | Stop reason: HOSPADM

## 2022-10-25 RX ADMIN — EPHEDRINE SULFATE 5 MG: 50 INJECTION INTRAVENOUS at 17:19

## 2022-10-25 RX ADMIN — LIDOCAINE HYDROCHLORIDE AND EPINEPHRINE 2 ML: 15; 5 INJECTION, SOLUTION EPIDURAL at 13:11

## 2022-10-25 RX ADMIN — LIDOCAINE HYDROCHLORIDE AND EPINEPHRINE 3 ML: 15; 5 INJECTION, SOLUTION EPIDURAL at 13:06

## 2022-10-25 RX ADMIN — OXYTOCIN 1 MILLI-UNITS/MIN: 10 INJECTION, SOLUTION INTRAMUSCULAR; INTRAVENOUS at 07:44

## 2022-10-25 RX ADMIN — SODIUM CHLORIDE, POTASSIUM CHLORIDE, SODIUM LACTATE AND CALCIUM CHLORIDE 125 ML/HR: 600; 310; 30; 20 INJECTION, SOLUTION INTRAVENOUS at 15:23

## 2022-10-25 RX ADMIN — Medication 10 ML/HR: at 13:11

## 2022-10-25 RX ADMIN — EPHEDRINE SULFATE 5 MG: 50 INJECTION INTRAVENOUS at 15:18

## 2022-10-25 RX ADMIN — WITCH HAZEL 1 PAD: 500 SOLUTION RECTAL; TOPICAL at 22:41

## 2022-10-25 RX ADMIN — SODIUM CHLORIDE, POTASSIUM CHLORIDE, SODIUM LACTATE AND CALCIUM CHLORIDE 1000 ML: 600; 310; 30; 20 INJECTION, SOLUTION INTRAVENOUS at 12:04

## 2022-10-25 RX ADMIN — FENTANYL CITRATE 100 MCG: 50 INJECTION, SOLUTION INTRAMUSCULAR; INTRAVENOUS at 13:06

## 2022-10-25 RX ADMIN — Medication: at 22:41

## 2022-10-25 RX ADMIN — SODIUM CHLORIDE, POTASSIUM CHLORIDE, SODIUM LACTATE AND CALCIUM CHLORIDE 125 ML/HR: 600; 310; 30; 20 INJECTION, SOLUTION INTRAVENOUS at 05:40

## 2022-10-25 NOTE — ANESTHESIA PREPROCEDURE EVALUATION
Anesthesia Evaluation     Patient summary reviewed and Nursing notes reviewed   no history of anesthetic complications:  NPO Solid Status: > 8 hours  NPO Liquid Status: > 2 hours           Airway   Mallampati: II  TM distance: >3 FB  Neck ROM: full  No difficulty expected  Dental      Pulmonary - negative pulmonary ROS and normal exam    breath sounds clear to auscultation  Cardiovascular - negative cardio ROS and normal exam  Exercise tolerance: good (4-7 METS)    Rhythm: regular  Rate: normal        Neuro/Psych- negative ROS  GI/Hepatic/Renal/Endo    (+)   diabetes mellitus using insulin,     Musculoskeletal     Abdominal    Substance History      OB/GYN    (+) Pregnant,         Other   blood dyscrasia thrombocytopenia,     ROS/Med Hx Other:                    Anesthesia Plan    ASA 2     epidural       Anesthetic plan, risks, benefits, and alternatives have been provided, discussed and informed consent has been obtained with: patient.        CODE STATUS:    Level Of Support Discussed With: Patient  Code Status (Patient has no pulse and is not breathing): CPR (Attempt to Resuscitate)  Medical Interventions (Patient has pulse or is breathing): Full

## 2022-10-25 NOTE — ANESTHESIA PROCEDURE NOTES
Labor Epidural      Patient reassessed immediately prior to procedure    Patient location during procedure: OB  Performed By  Anesthesiologist: Duglas Marie MD  Preanesthetic Checklist  Completed: patient identified, IV checked, risks and benefits discussed, surgical consent, monitors and equipment checked, pre-op evaluation and timeout performed  Prep:  Pt Position:sitting  Sterile Tech:cap, gloves, sterile barrier, mask and gown  Prep:chlorhexidine gluconate and isopropyl alcohol  Monitoring:blood pressure monitoring, continuous pulse oximetry and EKG  Epidural Block Procedure:  Approach:midline  Guidance:landmark technique and palpation technique  Location:L3-L4  Needle Type:Tuohy  Needle Gauge:17 G  Loss of Resistance Medium: saline  Loss of Resistance: 5cm  Cath Depth at skin:10 cm  Paresthesia: none  Aspiration:negative  Test Dose:negative  Medication: fentaNYL citrate (PF) (SUBLIMAZE) injection - Epidural   100 mcg - 10/25/2022 1:06:00 PM  lidocaine 1.5%-EPINEPHrine 1:200,000 (XYLOCAINE W/EPI) injection - Epidural   3 mL - 10/25/2022 1:06:00 PM   2 mL - 10/25/2022 1:11:00 PM  Number of Attempts: 1  Post Assessment:  Dressing:occlusive dressing applied and secured with tape  Pt Tolerance:patient tolerated the procedure well with no apparent complications  Complications:no

## 2022-10-26 LAB
GLUCOSE BLDC GLUCOMTR-MCNC: 114 MG/DL (ref 70–130)
GLUCOSE BLDC GLUCOMTR-MCNC: 116 MG/DL (ref 70–130)
GLUCOSE BLDC GLUCOMTR-MCNC: 150 MG/DL (ref 70–130)
GLUCOSE BLDC GLUCOMTR-MCNC: 178 MG/DL (ref 70–130)

## 2022-10-26 RX ORDER — PROMETHAZINE HYDROCHLORIDE 12.5 MG/1
12.5 TABLET ORAL EVERY 4 HOURS PRN
Status: DISCONTINUED | OUTPATIENT
Start: 2022-10-26 | End: 2022-10-27 | Stop reason: HOSPADM

## 2022-10-26 RX ORDER — ONDANSETRON 4 MG/1
4 TABLET, FILM COATED ORAL EVERY 8 HOURS PRN
Status: DISCONTINUED | OUTPATIENT
Start: 2022-10-26 | End: 2022-10-27 | Stop reason: HOSPADM

## 2022-10-26 RX ORDER — NICOTINE POLACRILEX 4 MG
15 LOZENGE BUCCAL
Status: DISCONTINUED | OUTPATIENT
Start: 2022-10-26 | End: 2022-10-27 | Stop reason: HOSPADM

## 2022-10-26 RX ORDER — IBUPROFEN 800 MG/1
800 TABLET ORAL EVERY 8 HOURS SCHEDULED
Status: DISCONTINUED | OUTPATIENT
Start: 2022-10-26 | End: 2022-10-27 | Stop reason: HOSPADM

## 2022-10-26 RX ORDER — DOCUSATE SODIUM 100 MG/1
100 CAPSULE, LIQUID FILLED ORAL DAILY
Status: DISCONTINUED | OUTPATIENT
Start: 2022-10-26 | End: 2022-10-27 | Stop reason: HOSPADM

## 2022-10-26 RX ORDER — SODIUM CHLORIDE 0.9 % (FLUSH) 0.9 %
1-10 SYRINGE (ML) INJECTION AS NEEDED
Status: DISCONTINUED | OUTPATIENT
Start: 2022-10-26 | End: 2022-10-27 | Stop reason: HOSPADM

## 2022-10-26 RX ORDER — CALCIUM CARBONATE 200(500)MG
2 TABLET,CHEWABLE ORAL 3 TIMES DAILY PRN
Status: DISCONTINUED | OUTPATIENT
Start: 2022-10-26 | End: 2022-10-27 | Stop reason: HOSPADM

## 2022-10-26 RX ORDER — HYDROCODONE BITARTRATE AND ACETAMINOPHEN 5; 325 MG/1; MG/1
1 TABLET ORAL EVERY 6 HOURS PRN
Status: DISCONTINUED | OUTPATIENT
Start: 2022-10-26 | End: 2022-10-27 | Stop reason: HOSPADM

## 2022-10-26 RX ORDER — BISACODYL 10 MG
10 SUPPOSITORY, RECTAL RECTAL DAILY PRN
Status: DISCONTINUED | OUTPATIENT
Start: 2022-10-26 | End: 2022-10-27 | Stop reason: HOSPADM

## 2022-10-26 RX ORDER — ACETAMINOPHEN 500 MG
500 TABLET ORAL EVERY 6 HOURS PRN
Status: DISCONTINUED | OUTPATIENT
Start: 2022-10-26 | End: 2022-10-27 | Stop reason: HOSPADM

## 2022-10-26 RX ORDER — DEXTROSE MONOHYDRATE 25 G/50ML
25 INJECTION, SOLUTION INTRAVENOUS
Status: DISCONTINUED | OUTPATIENT
Start: 2022-10-26 | End: 2022-10-27 | Stop reason: HOSPADM

## 2022-10-26 RX ADMIN — IBUPROFEN 800 MG: 800 TABLET, FILM COATED ORAL at 14:01

## 2022-10-26 RX ADMIN — IBUPROFEN 600 MG: 600 TABLET, FILM COATED ORAL at 00:56

## 2022-10-26 RX ADMIN — HYDROCODONE BITARTRATE AND ACETAMINOPHEN 1 TABLET: 5; 325 TABLET ORAL at 03:00

## 2022-10-26 RX ADMIN — HYDROCODONE BITARTRATE AND ACETAMINOPHEN 1 TABLET: 5; 325 TABLET ORAL at 14:02

## 2022-10-26 RX ADMIN — ACETAMINOPHEN 500 MG: 500 TABLET, FILM COATED ORAL at 18:36

## 2022-10-26 RX ADMIN — DOCUSATE SODIUM 100 MG: 100 CAPSULE, LIQUID FILLED ORAL at 08:25

## 2022-10-26 RX ADMIN — ACETAMINOPHEN 500 MG: 500 TABLET, FILM COATED ORAL at 11:05

## 2022-10-26 RX ADMIN — HYDROCODONE BITARTRATE AND ACETAMINOPHEN 1 TABLET: 5; 325 TABLET ORAL at 08:24

## 2022-10-26 RX ADMIN — IBUPROFEN 800 MG: 800 TABLET, FILM COATED ORAL at 21:44

## 2022-10-26 RX ADMIN — IBUPROFEN 800 MG: 800 TABLET, FILM COATED ORAL at 05:33

## 2022-10-26 RX ADMIN — HYDROCODONE BITARTRATE AND ACETAMINOPHEN 1 TABLET: 5; 325 TABLET ORAL at 23:02

## 2022-10-26 NOTE — ANESTHESIA POSTPROCEDURE EVALUATION
Patient: Yamilet Dempsey    Procedure Summary     Date: 10/25/22 Room / Location:     Anesthesia Start: 1255 Anesthesia Stop: 1935    Procedure: LABOR ANALGESIA Diagnosis:     Scheduled Providers:  Provider: Duglas Marie MD    Anesthesia Type: epidural ASA Status: 2          Anesthesia Type: epidural    Vitals  Vitals Value Taken Time   /74 10/26/22 0552   Temp 36.3 °C (97.3 °F) 10/26/22 0552   Pulse 66 10/26/22 0552   Resp 20 10/26/22 0552   SpO2 100 % 10/25/22 1936   Vitals shown include unvalidated device data.        Post Anesthesia Care and Evaluation    Patient location during evaluation: bedside  Patient participation: complete - patient participated  Level of consciousness: awake  Pain management: adequate    Airway patency: patent  Anesthetic complications: No anesthetic complications  PONV Status: controlled  Cardiovascular status: acceptable and stable  Respiratory status: acceptable  Hydration status: acceptable  Post Neuraxial Block status: Motor and sensory function returned to baseline and No signs or symptoms of PDPH

## 2022-10-26 NOTE — ANESTHESIA POSTPROCEDURE EVALUATION
Patient: Yamilet Dempsey    Procedure Summary     Date: 10/25/22 Room / Location:     Anesthesia Start: 1255 Anesthesia Stop: 1935    Procedure: LABOR ANALGESIA Diagnosis:     Scheduled Providers:  Provider: Duglas Marie MD    Anesthesia Type: epidural ASA Status: 2          Anesthesia Type: epidural    Vitals  Vitals Value Taken Time   /74 10/26/22 0552   Temp 36.3 °C (97.3 °F) 10/26/22 0552   Pulse 66 10/26/22 0552   Resp 20 10/26/22 0552   SpO2 100 % 10/25/22 1936   Vitals shown include unvalidated device data.        Post Anesthesia Care and Evaluation    Patient location during evaluation: bedside  Patient participation: complete - patient participated  Level of consciousness: awake and alert  Pain management: adequate    Airway patency: patent  Anesthetic complications: No anesthetic complications  PONV Status: none  Cardiovascular status: acceptable  Respiratory status: acceptable  Hydration status: acceptable    Comments: An Anesthesiologist personally participated in the most demanding procedures (including induction and emergence if applicable) in the anesthesia plan, monitored the course of anesthesia administration at frequent intervals and remained physically present and available for immediate diagnosis and treatment of emergencies.

## 2022-10-27 VITALS
DIASTOLIC BLOOD PRESSURE: 80 MMHG | OXYGEN SATURATION: 100 % | HEART RATE: 82 BPM | RESPIRATION RATE: 18 BRPM | SYSTOLIC BLOOD PRESSURE: 126 MMHG | TEMPERATURE: 97.3 F

## 2022-10-27 LAB — GLUCOSE BLDC GLUCOMTR-MCNC: 77 MG/DL (ref 70–130)

## 2022-10-27 RX ORDER — HYDROCODONE BITARTRATE AND ACETAMINOPHEN 5; 325 MG/1; MG/1
1 TABLET ORAL EVERY 6 HOURS PRN
Qty: 10 TABLET | Refills: 0 | Status: SHIPPED | OUTPATIENT
Start: 2022-10-27 | End: 2022-11-02

## 2022-10-27 RX ORDER — PSEUDOEPHEDRINE HCL 30 MG
100 TABLET ORAL 2 TIMES DAILY PRN
Qty: 20 CAPSULE | Refills: 0 | Status: SHIPPED | OUTPATIENT
Start: 2022-10-27

## 2022-10-27 RX ORDER — IBUPROFEN 800 MG/1
800 TABLET ORAL EVERY 8 HOURS SCHEDULED
Qty: 30 TABLET | Refills: 0 | Status: SHIPPED | OUTPATIENT
Start: 2022-10-27

## 2022-10-27 RX ADMIN — IBUPROFEN 800 MG: 800 TABLET, FILM COATED ORAL at 05:31

## 2022-10-27 RX ADMIN — ACETAMINOPHEN 500 MG: 500 TABLET, FILM COATED ORAL at 10:58

## 2022-10-27 RX ADMIN — DOCUSATE SODIUM 100 MG: 100 CAPSULE, LIQUID FILLED ORAL at 09:59

## 2022-11-06 ENCOUNTER — TELEPHONE (OUTPATIENT)
Dept: LACTATION | Facility: HOSPITAL | Age: 27
End: 2022-11-06

## 2022-11-06 NOTE — TELEPHONE ENCOUNTER
Pt reports breastfeeding going really good, baby is gaining wt, she is using her Haakaa when she feeds and is able to collect 3-4oz each feeding. She has no questions or concerns at this time, she was encouraged to reach out to LC as needed.

## 2022-12-12 ENCOUNTER — POSTPARTUM VISIT (OUTPATIENT)
Dept: OBSTETRICS AND GYNECOLOGY | Facility: CLINIC | Age: 27
End: 2022-12-12

## 2022-12-12 VITALS
SYSTOLIC BLOOD PRESSURE: 136 MMHG | HEART RATE: 71 BPM | WEIGHT: 153 LBS | BODY MASS INDEX: 27.1 KG/M2 | DIASTOLIC BLOOD PRESSURE: 82 MMHG

## 2022-12-12 PROCEDURE — 0503F POSTPARTUM CARE VISIT: CPT | Performed by: OBSTETRICS & GYNECOLOGY

## 2022-12-12 NOTE — PROGRESS NOTES
POSTPARTUM Follow Up Visit      Chief Complaint   Patient presents with   • Postpartum Care     HPI:      • Date of delivery: 10/25/22  • Delivery type:            Perineum: 2nd degree laceration  • Delivering Provider:   Dr. Doris Herbert      • Feeding: Breast  • Pain:  No  • Vaginal Bleeding:  No  • Depressed/Anxious:  No  EPDS score: 0   #10: 0  • Plans for BC:  None  • Last pap date and result: 2022, WNL     PHYSICAL EXAM:  /82   Pulse 71   Wt 69.4 kg (153 lb)   LMP 2022   Breastfeeding Yes   BMI 27.10 kg/m²  2.087 kg (4 lb 9.6 oz)  General- NAD, alert and oriented, appropriate  Psych- Normal mood, good memory, good eye contact  Abdomen- Soft, non distended, non tender, no masses    External genitalia- Normal.    Urethra/Bladder/Vagina- Normal, no masses, non-tender, no prolapse   STITCHES: Intact, well approximated, no evidence of infection  Cvx- Normal, no lesions, no discharge, no CMT  Uterus- Normal size, shape & consistency.  Non tender, mobile, & no prolapse  Adnexa- Normal, no mass, non-tender    Lymphatic- No palpable groin nodes  Ext- No edema    ASSESSMENT AND PLAN:  Diagnoses and all orders for this visit:    1. Postpartum follow-up (Primary)      Counseling:    • All birth control options reviewed in detail.  R/B/A/SE/E of each wrt pts PMHx and prior BC use    TRACK MENSES, RTO if <q21d, >7d long, heavy or painful.      Follow Up:  Return in about 3 months (around 3/12/2023) for Annual exam.    I spent 20 minutes on the separately reported service of postpartum care. This time is not included in the time used to support the E/M service also reported today.  I spent 20 minutes caring for Yamilet on this date of service. This time includes time spent by me in the following activities:reviewing tests, obtaining and/or reviewing a separately obtained history, performing a medically appropriate examination and/or evaluation  and counseling and educating the  patient/family/caregiver     Doris HerbertDO  12/12/2022    [facility]  [address] [place] [address]  Dept: [phone]  Dept Fax: [phone]  Loc: [phone]  Loc Fax: [phone]

## 2023-01-24 ENCOUNTER — PREP FOR SURGERY (OUTPATIENT)
Dept: OTHER | Facility: HOSPITAL | Age: 28
End: 2023-01-24
Payer: COMMERCIAL

## 2023-01-24 ENCOUNTER — OFFICE VISIT (OUTPATIENT)
Dept: SURGERY | Facility: CLINIC | Age: 28
End: 2023-01-24
Payer: COMMERCIAL

## 2023-01-24 VITALS — WEIGHT: 147 LBS | BODY MASS INDEX: 26.05 KG/M2 | HEIGHT: 63 IN | RESPIRATION RATE: 12 BRPM

## 2023-01-24 DIAGNOSIS — R22.30 SHOULDER MASS: Primary | ICD-10-CM

## 2023-01-24 PROCEDURE — 99203 OFFICE O/P NEW LOW 30 MIN: CPT | Performed by: SURGERY

## 2023-01-24 RX ORDER — SODIUM CHLORIDE 0.9 % (FLUSH) 0.9 %
10 SYRINGE (ML) INJECTION AS NEEDED
Status: CANCELLED | OUTPATIENT
Start: 2023-01-24

## 2023-01-24 RX ORDER — SODIUM CHLORIDE 0.9 % (FLUSH) 0.9 %
10 SYRINGE (ML) INJECTION EVERY 12 HOURS SCHEDULED
Status: CANCELLED | OUTPATIENT
Start: 2023-01-24

## 2023-01-24 RX ORDER — CEFAZOLIN SODIUM 2 G/100ML
2 INJECTION, SOLUTION INTRAVENOUS ONCE
Status: CANCELLED | OUTPATIENT
Start: 2023-01-24 | End: 2023-01-24

## 2023-01-24 RX ORDER — SODIUM CHLORIDE 9 MG/ML
40 INJECTION, SOLUTION INTRAVENOUS AS NEEDED
Status: CANCELLED | OUTPATIENT
Start: 2023-01-24

## 2023-01-24 NOTE — PROGRESS NOTES
"Chief Complaint: Cyst (Right shoulder)    Subjective         History of Present Illness  Yamilet Dempsey is a 27 y.o. female presents to [facility]. The patient is to be seen for a back mass.    Back mass  The patient reports she has 2 small cysts on her back. She states they increased in size for 2 weeks and then decreased in size. She had a biopsy performed at [facility]. She believes it was EBV virus. She denies any drainage. She states she was laying down to nurse her baby and thought something was underneath her. She states it was extremely painful. She states within 2 weeks it had grown and was extremely painful. She was taking ibuprofen daily for approximately 1 month. She states it does not feel painful now. She is breastfeeding. The patient prefers to have the mass removed in the hospital. The patient denies being on blood thinners, but states that her platelets do fluctuate. She reports that her lowest blood count level was 90UL.    Type 1 diabetes  The patient states she is a type 1 diabetic. She states she has a sensor on.    Objective     Past Medical History:   Diagnosis Date   • Diabetes mellitus (HCC)        Past Surgical History:   Procedure Laterality Date   •  SECTION  2015    DR BROWN [place]         Current Outpatient Medications:   •  Continuous Blood Gluc Sensor (Dexcom G6 Sensor), , Disp: , Rfl:   •  Glucagon, rDNA, (Glucagon Emergency) 1 MG kit, INJECT 1 MG INTO THE SKIN ONCE AS NEEDED, Disp: , Rfl:   •  Insulin Disposable Pump (OmniPod Dash 5 Pack Pods) misc, , Disp: , Rfl:   •  insulin lispro (humaLOG) 100 UNIT/ML injection, , Disp: , Rfl:   •  acetone, urine, test (Ketostix) strip, Test in am and for BG > 200 -1 month supply., Disp: , Rfl:   •  B-D INS SYR ULTRAFINE 1CC/30G 30G X 1/2\" 1 ML misc, , Disp: , Rfl:   •  Continuous Blood Gluc Transmit (Dexcom G6 Transmitter) misc, , Disp: , Rfl:   •  docusate sodium 100 MG capsule, Take 1 capsule by " "mouth 2 (Two) Times a Day As Needed for Constipation., Disp: 20 capsule, Rfl: 0  •  folic acid (FOLVITE) 1 MG tablet, Take 1 mg by mouth Daily., Disp: , Rfl:   •  glucose blood (OneTouch Verio) test strip, To test 10 x day - 3 month supply Use as instructed. Dispense brand covered by insurance., Disp: , Rfl:   •  ibuprofen (ADVIL,MOTRIN) 800 MG tablet, Take 1 tablet by mouth Every 8 (Eight) Hours., Disp: 30 tablet, Rfl: 0  •  Insulin Disposable Pump (OmniPod Dash 5 Pack Pods) misc, USE 1 POD EVERY 72 HOURS, Disp: , Rfl:   •  Lancets (OneTouch Delica Plus [id number]) misc, TEST 10 TIMES A DAY, Disp: , Rfl:   •  OneTouch Verio test strip, TEST 10 TIMES A DAY, Disp: , Rfl:   •  Prenatal MV-Min-Fe Fum-FA-DHA (PRENATAL 1 PO), , Disp: , Rfl:     No Known Allergies     Family History   Problem Relation Age of Onset   • Liver cancer Mother          AGE 32   • Breast cancer Maternal Grandmother    • Diabetes Maternal Grandmother    • Diabetes Maternal Grandfather        Social History     Socioeconomic History   • Marital status:      Spouse name: TAMIR   • Number of children: 3   Tobacco Use   • Smoking status: Never   • Smokeless tobacco: Never   Vaping Use   • Vaping Use: Never used   Substance and Sexual Activity   • Alcohol use: Never   • Drug use: Never   • Sexual activity: Yes     Partners: Male     Birth control/protection: None       Vital Signs:   Resp 12   Ht 160 cm (63\")   Wt 66.7 kg (147 lb)   BMI 26.04 kg/m²      Physical Exam     No acute distress. Nonlabored breathing. Abdomen is soft. On her right shoulder, she has a 1.5 cm firm lesion. There are no overlying skin changes. No redness or erythema. It is minimally tender to palpation.    Result Review :            Procedures        Assessment and Plan    There are no diagnoses linked to this encounter.     1.Patient with a right shoulder mass. Previous biopsy showed a necrotizing granuloma. She believes that there may be some viral inclusions on " her subsequent pathology.    We will plan for excision of this mass in the operating room in the near future. Risks, benefits, alternatives of the procedure were discussed extensively. All questions were answered. Patient voiced understanding and agreed to proceed with the above plan.          Follow Up   No follow-ups on file.  Patient was given instructions and counseling regarding her condition or for health maintenance advice. Please see specific information pulled into the AVS if appropriate.       Transcribed from ambient dictation for Jose Hicks MD by Caprice Roger.  01/24/23   13:50 EST    Patient or patient representative verbalized consent to the visit recording.  I have personally performed the services described in this document as transcribed by the above individual, and it is both accurate and complete.

## 2023-01-25 ENCOUNTER — TELEPHONE (OUTPATIENT)
Dept: SURGERY | Facility: CLINIC | Age: 28
End: 2023-01-25
Payer: COMMERCIAL

## 2023-01-25 NOTE — TELEPHONE ENCOUNTER
Pt is needing to reschedule surgery on 2/1/23. She is thinking maybe last week in Feb or early March. [facility] [phone]

## 2023-01-26 ENCOUNTER — TELEPHONE (OUTPATIENT)
Dept: SURGERY | Facility: CLINIC | Age: 28
End: 2023-01-26
Payer: COMMERCIAL

## 2023-01-26 NOTE — TELEPHONE ENCOUNTER
I attempted to reach patient to reschedule her surgery. She did not answer but I did leave a vm requesting a call back.

## 2023-01-26 NOTE — TELEPHONE ENCOUNTER
Katya with the [facility] called today to let us know that the patient's referral needs to be updated with the procedure code for her right shoulder mass excision that is currently scheduled for 02/01/23. She is also wanting to see if we can add the size of the mass as well. I will look into this and update if I find the information being requested.

## 2023-03-13 ENCOUNTER — TELEPHONE (OUTPATIENT)
Dept: SURGERY | Facility: CLINIC | Age: 28
End: 2023-03-13
Payer: COMMERCIAL

## 2023-03-13 NOTE — TELEPHONE ENCOUNTER
Pt wants to cancel surgery that is on for Friday 03/17. She said that the cysts are no longer there.

## 2023-03-13 NOTE — TELEPHONE ENCOUNTER
Spoke with patient to confirm cancellation. Patient confirmed. States that she has checked her cyst multiple times with the help of her  and they are no longer there. Patient was advised to call our office back if they appear again or if she has any other questions or concerns. Patient verbalized understanding and the surgery for 03/17/23 was cancelled.

## 2023-08-31 ENCOUNTER — HOSPITAL ENCOUNTER (OUTPATIENT)
Dept: CARDIOLOGY | Facility: HOSPITAL | Age: 28
Discharge: HOME | End: 2023-08-31
Payer: COMMERCIAL

## 2023-08-31 DIAGNOSIS — M79.662 PAIN IN LEFT LOWER LEG: ICD-10-CM

## 2023-08-31 LAB
BH CV LOWER VASCULAR LEFT COMMON FEMORAL AUGMENT: NORMAL
BH CV LOWER VASCULAR LEFT COMMON FEMORAL COMPETENT: NORMAL
BH CV LOWER VASCULAR LEFT COMMON FEMORAL COMPRESS: NORMAL
BH CV LOWER VASCULAR LEFT COMMON FEMORAL PHASIC: NORMAL
BH CV LOWER VASCULAR LEFT COMMON FEMORAL SPONT: NORMAL
BH CV LOWER VASCULAR LEFT DISTAL FEMORAL COMPRESS: NORMAL
BH CV LOWER VASCULAR LEFT GASTRONEMIUS COMPRESS: NORMAL
BH CV LOWER VASCULAR LEFT GREATER SAPH AK COMPRESS: NORMAL
BH CV LOWER VASCULAR LEFT GREATER SAPH BK COMPRESS: NORMAL
BH CV LOWER VASCULAR LEFT LESSER SAPH COMPRESS: NORMAL
BH CV LOWER VASCULAR LEFT MID FEMORAL AUGMENT: NORMAL
BH CV LOWER VASCULAR LEFT MID FEMORAL COMPETENT: NORMAL
BH CV LOWER VASCULAR LEFT MID FEMORAL COMPRESS: NORMAL
BH CV LOWER VASCULAR LEFT MID FEMORAL PHASIC: NORMAL
BH CV LOWER VASCULAR LEFT MID FEMORAL SPONT: NORMAL
BH CV LOWER VASCULAR LEFT PERONEAL COMPRESS: NORMAL
BH CV LOWER VASCULAR LEFT POPLITEAL AUGMENT: NORMAL
BH CV LOWER VASCULAR LEFT POPLITEAL COMPETENT: NORMAL
BH CV LOWER VASCULAR LEFT POPLITEAL COMPRESS: NORMAL
BH CV LOWER VASCULAR LEFT POPLITEAL PHASIC: NORMAL
BH CV LOWER VASCULAR LEFT POPLITEAL SPONT: NORMAL
BH CV LOWER VASCULAR LEFT POSTERIOR TIBIAL COMPRESS: NORMAL
BH CV LOWER VASCULAR LEFT PROXIMAL FEMORAL COMPRESS: NORMAL
BH CV LOWER VASCULAR LEFT SAPHENOFEMORAL JUNCTION COMPRESS: NORMAL
BH CV LOWER VASCULAR RIGHT COMMON FEMORAL AUGMENT: NORMAL
BH CV LOWER VASCULAR RIGHT COMMON FEMORAL COMPETENT: NORMAL
BH CV LOWER VASCULAR RIGHT COMMON FEMORAL COMPRESS: NORMAL
BH CV LOWER VASCULAR RIGHT COMMON FEMORAL PHASIC: NORMAL
BH CV LOWER VASCULAR RIGHT COMMON FEMORAL SPONT: NORMAL

## 2023-08-31 PROCEDURE — 93971 EXTREMITY STUDY: CPT

## 2024-03-19 ENCOUNTER — LAB (OUTPATIENT)
Dept: LAB | Facility: HOSPITAL | Age: 29
End: 2024-03-19
Payer: COMMERCIAL

## 2024-03-19 ENCOUNTER — OFFICE VISIT (OUTPATIENT)
Dept: FAMILY MEDICINE CLINIC | Facility: CLINIC | Age: 29
End: 2024-03-19
Payer: COMMERCIAL

## 2024-03-19 VITALS
OXYGEN SATURATION: 100 % | HEIGHT: 63 IN | HEART RATE: 86 BPM | DIASTOLIC BLOOD PRESSURE: 80 MMHG | TEMPERATURE: 97.6 F | BODY MASS INDEX: 26.17 KG/M2 | SYSTOLIC BLOOD PRESSURE: 122 MMHG | WEIGHT: 147.7 LBS

## 2024-03-19 DIAGNOSIS — Z13.220 SCREENING FOR LIPID DISORDERS: ICD-10-CM

## 2024-03-19 DIAGNOSIS — R16.2 HEPATOSPLENOMEGALY: ICD-10-CM

## 2024-03-19 DIAGNOSIS — Z80.0 FAMILY HISTORY OF LIVER CANCER: ICD-10-CM

## 2024-03-19 DIAGNOSIS — Z00.00 ANNUAL PHYSICAL EXAM: Primary | ICD-10-CM

## 2024-03-19 DIAGNOSIS — D69.6 THROMBOCYTOPENIA: ICD-10-CM

## 2024-03-19 DIAGNOSIS — Z11.59 ENCOUNTER FOR HEPATITIS C SCREENING TEST FOR LOW RISK PATIENT: ICD-10-CM

## 2024-03-19 DIAGNOSIS — E10.9 TYPE 1 DIABETES MELLITUS WITHOUT COMPLICATION: ICD-10-CM

## 2024-03-19 DIAGNOSIS — Z76.89 ESTABLISHING CARE WITH NEW DOCTOR, ENCOUNTER FOR: ICD-10-CM

## 2024-03-19 DIAGNOSIS — Z00.00 ANNUAL PHYSICAL EXAM: ICD-10-CM

## 2024-03-19 LAB
ALBUMIN SERPL-MCNC: 4.3 G/DL (ref 3.5–5.2)
ALBUMIN UR-MCNC: <1.2 MG/DL
ALBUMIN/GLOB SERPL: 1.4 G/DL
ALP SERPL-CCNC: 77 U/L (ref 39–117)
ALT SERPL W P-5'-P-CCNC: 10 U/L (ref 1–33)
ANION GAP SERPL CALCULATED.3IONS-SCNC: 11.9 MMOL/L (ref 5–15)
AST SERPL-CCNC: 23 U/L (ref 1–32)
BASOPHILS # BLD AUTO: 0.02 10*3/MM3 (ref 0–0.2)
BASOPHILS NFR BLD AUTO: 0.4 % (ref 0–1.5)
BILIRUB SERPL-MCNC: 0.3 MG/DL (ref 0–1.2)
BILIRUB UR QL STRIP: NEGATIVE
BUN SERPL-MCNC: 11 MG/DL (ref 6–20)
BUN/CREAT SERPL: 11.1 (ref 7–25)
CALCIUM SPEC-SCNC: 9.2 MG/DL (ref 8.6–10.5)
CHLORIDE SERPL-SCNC: 102 MMOL/L (ref 98–107)
CHOLEST SERPL-MCNC: 145 MG/DL (ref 0–200)
CLARITY UR: CLEAR
CO2 SERPL-SCNC: 22.1 MMOL/L (ref 22–29)
COLOR UR: YELLOW
CREAT SERPL-MCNC: 0.99 MG/DL (ref 0.57–1)
CREAT UR-MCNC: 37.2 MG/DL
DEPRECATED RDW RBC AUTO: 41.5 FL (ref 37–54)
EGFRCR SERPLBLD CKD-EPI 2021: 78.3 ML/MIN/1.73
EOSINOPHIL # BLD AUTO: 0.06 10*3/MM3 (ref 0–0.4)
EOSINOPHIL NFR BLD AUTO: 1.1 % (ref 0.3–6.2)
ERYTHROCYTE [DISTWIDTH] IN BLOOD BY AUTOMATED COUNT: 14.4 % (ref 12.3–15.4)
GLOBULIN UR ELPH-MCNC: 3 GM/DL
GLUCOSE SERPL-MCNC: 220 MG/DL (ref 65–99)
GLUCOSE UR STRIP-MCNC: ABNORMAL MG/DL
HBA1C MFR BLD: 7.4 % (ref 4.8–5.6)
HCT VFR BLD AUTO: 37 % (ref 34–46.6)
HCV AB SER DONR QL: NORMAL
HDLC SERPL-MCNC: 47 MG/DL (ref 40–60)
HGB BLD-MCNC: 12.2 G/DL (ref 12–15.9)
HGB UR QL STRIP.AUTO: NEGATIVE
HOLD SPECIMEN: NORMAL
IMM GRANULOCYTES # BLD AUTO: 0.01 10*3/MM3 (ref 0–0.05)
IMM GRANULOCYTES NFR BLD AUTO: 0.2 % (ref 0–0.5)
KETONES UR QL STRIP: NEGATIVE
LDLC SERPL CALC-MCNC: 75 MG/DL (ref 0–100)
LDLC/HDLC SERPL: 1.54 {RATIO}
LEUKOCYTE ESTERASE UR QL STRIP.AUTO: NEGATIVE
LYMPHOCYTES # BLD AUTO: 1.64 10*3/MM3 (ref 0.7–3.1)
LYMPHOCYTES NFR BLD AUTO: 30.6 % (ref 19.6–45.3)
MCH RBC QN AUTO: 26.8 PG (ref 26.6–33)
MCHC RBC AUTO-ENTMCNC: 33 G/DL (ref 31.5–35.7)
MCV RBC AUTO: 81.3 FL (ref 79–97)
MICROALBUMIN/CREAT UR: NORMAL MG/G{CREAT}
MONOCYTES # BLD AUTO: 0.32 10*3/MM3 (ref 0.1–0.9)
MONOCYTES NFR BLD AUTO: 6 % (ref 5–12)
NEUTROPHILS NFR BLD AUTO: 3.31 10*3/MM3 (ref 1.7–7)
NEUTROPHILS NFR BLD AUTO: 61.7 % (ref 42.7–76)
NITRITE UR QL STRIP: NEGATIVE
NRBC BLD AUTO-RTO: 0 /100 WBC (ref 0–0.2)
PH UR STRIP.AUTO: 6.5 [PH] (ref 5–8)
PLATELET # BLD AUTO: 154 10*3/MM3 (ref 140–450)
PMV BLD AUTO: 12.2 FL (ref 6–12)
POTASSIUM SERPL-SCNC: 3.5 MMOL/L (ref 3.5–5.2)
PROT SERPL-MCNC: 7.3 G/DL (ref 6–8.5)
PROT UR QL STRIP: NEGATIVE
RBC # BLD AUTO: 4.55 10*6/MM3 (ref 3.77–5.28)
SODIUM SERPL-SCNC: 136 MMOL/L (ref 136–145)
SP GR UR STRIP: 1.01 (ref 1–1.03)
TRIGL SERPL-MCNC: 128 MG/DL (ref 0–150)
TSH SERPL DL<=0.05 MIU/L-ACNC: 1.79 UIU/ML (ref 0.27–4.2)
UROBILINOGEN UR QL STRIP: ABNORMAL
VLDLC SERPL-MCNC: 23 MG/DL (ref 5–40)
WBC NRBC COR # BLD AUTO: 5.36 10*3/MM3 (ref 3.4–10.8)

## 2024-03-19 PROCEDURE — 84443 ASSAY THYROID STIM HORMONE: CPT

## 2024-03-19 PROCEDURE — 80061 LIPID PANEL: CPT

## 2024-03-19 PROCEDURE — 85025 COMPLETE CBC W/AUTO DIFF WBC: CPT

## 2024-03-19 PROCEDURE — 36415 COLL VENOUS BLD VENIPUNCTURE: CPT

## 2024-03-19 PROCEDURE — 80053 COMPREHEN METABOLIC PANEL: CPT

## 2024-03-19 PROCEDURE — 82043 UR ALBUMIN QUANTITATIVE: CPT

## 2024-03-19 PROCEDURE — 81003 URINALYSIS AUTO W/O SCOPE: CPT

## 2024-03-19 PROCEDURE — 86803 HEPATITIS C AB TEST: CPT

## 2024-03-19 PROCEDURE — 83036 HEMOGLOBIN GLYCOSYLATED A1C: CPT

## 2024-03-19 PROCEDURE — 82570 ASSAY OF URINE CREATININE: CPT

## 2024-03-19 NOTE — PROGRESS NOTES
Yamilet Dempsey presents to [facility] with complaints of cyst on left wrist, is also here to establish as a new patient.      History of Present Illness  This is a 29-year-old female, past medical history significant for diabetes mellitus type 1, hepatosplenomegaly, family history of liver cancer, who presents to clinic with cyst on left wrist and is also here to establish as a new patient.    Diabetes mellitus type 1: Patient states that she does continue to follow-up with endocrinology in regards to this, is stable on her medications, uses the OmniPod and Dexcom for this.  Patient states that she would not mind to have her blood work done, but is not having any adverse issues.    Patient also states that she has a past history of hepatosplenomegaly, was diagnosed with this actually when she was pretty young, her sister has the same issue and patient has had very thorough evaluation done of this.  Patient states she has had biopsies done, nothing is ever relieved come back conclusive as to why she has an enlarged spleen and enlarged liver, but it is something that she would like to have followed back up on because she has not had a scan done since 2020.  Is not having any symptoms.  Does have platelet issues as well but thinks it is all related also.  No longer sees a specialist.  Is even been tested for alpha-1 antitrypsin.    Patient states that she has a cyst on her left wrist area, states that she is kind of popped up, comes and goes, will cause her pain with repetitive use, which is wanting this evaluated.  Actually had a cyst on her shoulder about this time last year was going to have it removed, but it just went away on its own.  She has not had any issues since with that.  Denies any swelling, no redness, no other symptoms noted.    Due for annual blood work, will obtain.  Refuse vaccines/immunizations, otherwise up-to-date.        Past Medical History:   Diagnosis Date  "   Diabetes mellitus      Past Surgical History:     SECTION    DR BROWN [place]       Social History     Socioeconomic History    Marital status:      Spouse name: TAMIR    Number of children: 3   Tobacco Use    Smoking status: Never    Smokeless tobacco: Never   Vaping Use    Vaping status: Never Used   Substance and Sexual Activity    Alcohol use: Never    Drug use: Never    Sexual activity: Yes     Partners: Male     Birth control/protection: None       Family History   Problem Relation Age of Onset    Liver cancer Mother          AGE 32    Breast cancer Maternal Grandmother     Diabetes Maternal Grandmother     Diabetes Maternal Grandfather          Objective   Vital Signs:   /80 (BP Location: Left arm, Patient Position: Sitting, Cuff Size: Adult)   Pulse 86   Temp 97.6 °F (36.4 °C)   Ht 160 cm (63\")   Wt 67 kg (147 lb 11.2 oz)   SpO2 100%   BMI 26.16 kg/m²     Body mass index is 26.16 kg/m².    All labs, imaging, test results, and specialty provider notes reviewed with patient.       Physical Exam  Vitals reviewed.   Constitutional:       Appearance: Normal appearance.   Cardiovascular:      Rate and Rhythm: Normal rate and regular rhythm.      Pulses: Normal pulses.      Heart sounds: Normal heart sounds.   Pulmonary:      Effort: Pulmonary effort is normal.      Breath sounds: Normal breath sounds.   Neurological:      General: No focal deficit present.      Mental Status: She is alert and oriented to person, place, and time.                  BMI is >= 25 and <30. (Overweight) The following options were offered after discussion;: exercise counseling/recommendations and nutrition counseling/recommendations            Assessment and Plan:  Diagnoses and all orders for this visit:    1. Annual physical exam (Primary)  -     CBC Auto Differential; Future  -     Comprehensive Metabolic Panel; Future  -     TSH Rfx On Abnormal To Free T4; Future  -     Urinalysis With Culture If " Indicated -; Future    2. Screening for lipid disorders  -     Lipid Panel; Future    3. Encounter for hepatitis C screening test for low risk patient  -     Hepatitis C antibody; Future    4. Type 1 diabetes mellitus without complication  Comments:  Check A1c, continue to follow-up with endocrinology.  Orders:  -     Hemoglobin A1c; Future  -     Microalbumin / Creatinine Urine Ratio - Urine, Clean Catch; Future    5. Establishing care with new doctor, encounter for    6. Hepatosplenomegaly  Comments:  Repeat CT scan, has had thorough workup, consider sending back to specialist.  Orders:  -     CT Abdomen Pelvis With & Without Contrast; Future    7. Thrombocytopenia  Comments:  Likely due to enlarged liver, continue to monitor via blood work, discussed bleeding precautions.    8. Family history of liver cancer    Anticipatory Guidelines discussed with patient.    Discussed getting adequate exercise, reduced TV/electronic time, car safety including seat belt use, sexual activity (if applicable), and smoking/alcohol use (if applicable).      Follow Up:  Return in about 1 year (around 3/19/2025) for Annual physical.    Patient was given instructions and counseling regarding her condition or for health maintenance advice. Please see specific information pulled into the AVS if appropriate.

## 2024-03-22 ENCOUNTER — PATIENT ROUNDING (BHMG ONLY) (OUTPATIENT)
Dept: FAMILY MEDICINE CLINIC | Facility: CLINIC | Age: 29
End: 2024-03-22
Payer: COMMERCIAL

## 2024-12-17 ENCOUNTER — OFFICE VISIT (OUTPATIENT)
Dept: FAMILY MEDICINE CLINIC | Facility: CLINIC | Age: 29
End: 2024-12-17
Payer: COMMERCIAL

## 2024-12-17 VITALS
HEART RATE: 101 BPM | BODY MASS INDEX: 28.23 KG/M2 | OXYGEN SATURATION: 98 % | DIASTOLIC BLOOD PRESSURE: 70 MMHG | HEIGHT: 63 IN | WEIGHT: 159.3 LBS | SYSTOLIC BLOOD PRESSURE: 128 MMHG | TEMPERATURE: 97.4 F

## 2024-12-17 DIAGNOSIS — F41.9 ANXIETY: ICD-10-CM

## 2024-12-17 DIAGNOSIS — Z13.220 SCREENING FOR LIPID DISORDERS: ICD-10-CM

## 2024-12-17 DIAGNOSIS — E10.9 TYPE 1 DIABETES MELLITUS WITHOUT COMPLICATION: ICD-10-CM

## 2024-12-17 DIAGNOSIS — R59.1 LYMPHADENOPATHY: Primary | ICD-10-CM

## 2024-12-17 PROCEDURE — 1160F RVW MEDS BY RX/DR IN RCRD: CPT

## 2024-12-17 PROCEDURE — 99214 OFFICE O/P EST MOD 30 MIN: CPT

## 2024-12-17 PROCEDURE — 1159F MED LIST DOCD IN RCRD: CPT

## 2024-12-17 PROCEDURE — 3051F HG A1C>EQUAL 7.0%<8.0%: CPT

## 2024-12-17 RX ORDER — PREDNISONE 20 MG/1
20 TABLET ORAL 2 TIMES DAILY
Qty: 10 TABLET | Refills: 0 | Status: SHIPPED | OUTPATIENT
Start: 2024-12-17

## 2024-12-17 NOTE — PROGRESS NOTES
Yamilet Dempsey presents to [facility] with complaints of lymph node under left armpit, discuss anxiety.      History of Present Illness  This is a 29-year-old female who presents to the clinic with complaints of lymph node under left armpit and to discuss anxiety.    Diabetes type 1: Patient states that she has noticed that her sugars have been a little bit less controlled recently.  She does see her endocrine provider after the first of the year, but she is also been newly placed on the OmniPod/Dexcom combination.  States that she is gained a little bit of weight as well and is not sure if that is related to her sugars.  Less controlled or something else.  Denies any other side effects or symptoms related to this.  Would like full panel blood work.    Lymphadenopathy: Patient states that she is noticed over the past few weeks she has noticed this lymph node or other bump in her left armpit area.  Patient states that it is definitely worse at night, she has not noticed a rash, she has been having some upper respiratory symptoms such as congestion and allergy related symptoms but no actual infectious symptoms.  States that it will get pretty swollen at night and become extremely itchy.  Abdomen states that the itchiness will transfer over to and through her chest as well.  Patient states that she just does not know what this is and she has never had this happen before.  She denies any fever/chills/body aches, no visible signs externally either.  She has not started new medications, has not used new shampoos/lotions/deodorant.    Patient also states that her anxiety seems to be getting worse.  She is not sure if this is related to the enlarged lymph node or if this is just her getting older and having a harder time processing through the anxiety.  She does feel the anxiety on a daily basis, states that she gets overwhelmed/overstimulated very easily, does have a short fuse, and  "states that she feels like it is impacting her quality of life on a daily basis.  States that she is never taken medication before, just wants to discuss through this as she does not understand all the medications but does not want to be on anything addictive either.  States that she is okay with medication, but also wants to make sure it some that she can easily come off of.  Denies any suicidal thoughts or ideation.    The following portions of the patient's history were personally reviewed and updated as appropriate: allergies, current medications, past medical history, past surgical history, past family history, and past social history.       Objective   Vital Signs:   /70 (BP Location: Left arm, Patient Position: Sitting, Cuff Size: Adult)   Pulse 101   Temp 97.4 °F (36.3 °C)   Ht 160 cm (62.99\")   Wt 72.3 kg (159 lb 4.8 oz)   SpO2 98%   BMI 28.23 kg/m²     Body mass index is 28.23 kg/m².    All labs, imaging, test results, and specialty provider notes reviewed with patient.     Physical Exam  Vitals reviewed.   Constitutional:       Appearance: Normal appearance.   Cardiovascular:      Rate and Rhythm: Normal rate and regular rhythm.      Pulses: Normal pulses.      Heart sounds: Normal heart sounds.   Pulmonary:      Effort: Pulmonary effort is normal.      Breath sounds: Normal breath sounds.   Neurological:      General: No focal deficit present.      Mental Status: She is alert and oriented to person, place, and time.                               Assessment and Plan:  Diagnoses and all orders for this visit:    1. Lymphadenopathy (Primary)  Comments:  Will order ultrasound for further evaluation, provide prednisone that she can take after imaging.  Further treat based off results.  Orders:  -     US Soft Tissue; Future  -     predniSONE (DELTASONE) 20 MG tablet; Take 1 tablet by mouth 2 (Two) Times a Day.  Dispense: 10 tablet; Refill: 0    2. Anxiety  Comments:  Will start on Zoloft, discussed " risk/side effects and use of medication.  Patient to follow-up in 6 weeks.  Consider adjustment if needed.  Orders:  -     sertraline (Zoloft) 50 MG tablet; Take 1 tablet by mouth Daily.  Dispense: 30 tablet; Refill: 2    3. Type 1 diabetes mellitus without complication  Comments:  Will repeat full panel blood work, may need adjustments with insulin via endocrinology  Orders:  -     CBC Auto Differential; Future  -     Comprehensive Metabolic Panel; Future  -     TSH Rfx On Abnormal To Free T4; Future  -     Urinalysis With Culture If Indicated -; Future  -     Hemoglobin A1c; Future    4. Screening for lipid disorders  -     Lipid Panel; Future          Follow Up:  Return in about 6 weeks (around 1/28/2025).    Patient was given instructions and counseling regarding her condition or for health maintenance advice. Please see specific information pulled into the AVS if appropriate.

## 2024-12-23 ENCOUNTER — LAB (OUTPATIENT)
Dept: LAB | Facility: HOSPITAL | Age: 29
End: 2024-12-23
Payer: COMMERCIAL

## 2024-12-23 DIAGNOSIS — E10.9 TYPE 1 DIABETES MELLITUS WITHOUT COMPLICATION: ICD-10-CM

## 2024-12-23 DIAGNOSIS — Z13.220 SCREENING FOR LIPID DISORDERS: ICD-10-CM

## 2024-12-23 LAB
ALBUMIN SERPL-MCNC: 4.4 G/DL (ref 3.5–5.2)
ALBUMIN/GLOB SERPL: 1.4 G/DL
ALP SERPL-CCNC: 78 U/L (ref 39–117)
ALT SERPL W P-5'-P-CCNC: 11 U/L (ref 1–33)
ANION GAP SERPL CALCULATED.3IONS-SCNC: 15 MMOL/L (ref 5–15)
AST SERPL-CCNC: 28 U/L (ref 1–32)
BACTERIA UR QL AUTO: ABNORMAL /HPF
BASOPHILS # BLD AUTO: 0.03 10*3/MM3 (ref 0–0.2)
BASOPHILS NFR BLD AUTO: 0.6 % (ref 0–1.5)
BILIRUB SERPL-MCNC: 0.3 MG/DL (ref 0–1.2)
BILIRUB UR QL STRIP: NEGATIVE
BUN SERPL-MCNC: 11 MG/DL (ref 6–20)
BUN/CREAT SERPL: 10.6 (ref 7–25)
CALCIUM SPEC-SCNC: 9.4 MG/DL (ref 8.6–10.5)
CHLORIDE SERPL-SCNC: 101 MMOL/L (ref 98–107)
CHOLEST SERPL-MCNC: 165 MG/DL (ref 0–200)
CLARITY UR: ABNORMAL
CO2 SERPL-SCNC: 21 MMOL/L (ref 22–29)
COLOR UR: YELLOW
CREAT SERPL-MCNC: 1.04 MG/DL (ref 0.57–1)
DEPRECATED RDW RBC AUTO: 45.7 FL (ref 37–54)
EGFRCR SERPLBLD CKD-EPI 2021: 73.8 ML/MIN/1.73
EOSINOPHIL # BLD AUTO: 0.05 10*3/MM3 (ref 0–0.4)
EOSINOPHIL NFR BLD AUTO: 1 % (ref 0.3–6.2)
ERYTHROCYTE [DISTWIDTH] IN BLOOD BY AUTOMATED COUNT: 16.1 % (ref 12.3–15.4)
GLOBULIN UR ELPH-MCNC: 3.2 GM/DL
GLUCOSE SERPL-MCNC: 117 MG/DL (ref 65–99)
GLUCOSE UR STRIP-MCNC: NEGATIVE MG/DL
HBA1C MFR BLD: 7.6 % (ref 4.8–5.6)
HCT VFR BLD AUTO: 37.4 % (ref 34–46.6)
HDLC SERPL-MCNC: 53 MG/DL (ref 40–60)
HGB BLD-MCNC: 11.7 G/DL (ref 12–15.9)
HGB UR QL STRIP.AUTO: NEGATIVE
HOLD SPECIMEN: NORMAL
HYALINE CASTS UR QL AUTO: ABNORMAL /LPF
IMM GRANULOCYTES # BLD AUTO: 0.04 10*3/MM3 (ref 0–0.05)
IMM GRANULOCYTES NFR BLD AUTO: 0.8 % (ref 0–0.5)
KETONES UR QL STRIP: NEGATIVE
LDLC SERPL CALC-MCNC: 95 MG/DL (ref 0–100)
LDLC/HDLC SERPL: 1.77 {RATIO}
LEUKOCYTE ESTERASE UR QL STRIP.AUTO: ABNORMAL
LYMPHOCYTES # BLD AUTO: 1.37 10*3/MM3 (ref 0.7–3.1)
LYMPHOCYTES NFR BLD AUTO: 26.4 % (ref 19.6–45.3)
MCH RBC QN AUTO: 24.8 PG (ref 26.6–33)
MCHC RBC AUTO-ENTMCNC: 31.3 G/DL (ref 31.5–35.7)
MCV RBC AUTO: 79.2 FL (ref 79–97)
MONOCYTES # BLD AUTO: 0.38 10*3/MM3 (ref 0.1–0.9)
MONOCYTES NFR BLD AUTO: 7.3 % (ref 5–12)
NEUTROPHILS NFR BLD AUTO: 3.31 10*3/MM3 (ref 1.7–7)
NEUTROPHILS NFR BLD AUTO: 63.9 % (ref 42.7–76)
NITRITE UR QL STRIP: NEGATIVE
NRBC BLD AUTO-RTO: 0 /100 WBC (ref 0–0.2)
PH UR STRIP.AUTO: 6 [PH] (ref 5–8)
PLATELET # BLD AUTO: 173 10*3/MM3 (ref 140–450)
PMV BLD AUTO: 11.9 FL (ref 6–12)
POTASSIUM SERPL-SCNC: 3.8 MMOL/L (ref 3.5–5.2)
PROT SERPL-MCNC: 7.6 G/DL (ref 6–8.5)
PROT UR QL STRIP: ABNORMAL
RBC # BLD AUTO: 4.72 10*6/MM3 (ref 3.77–5.28)
RBC # UR STRIP: ABNORMAL /HPF
REF LAB TEST METHOD: ABNORMAL
SODIUM SERPL-SCNC: 137 MMOL/L (ref 136–145)
SP GR UR STRIP: 1.03 (ref 1–1.03)
SQUAMOUS #/AREA URNS HPF: ABNORMAL /HPF
TRIGL SERPL-MCNC: 90 MG/DL (ref 0–150)
TSH SERPL DL<=0.05 MIU/L-ACNC: 1.57 UIU/ML (ref 0.27–4.2)
UROBILINOGEN UR QL STRIP: ABNORMAL
VLDLC SERPL-MCNC: 17 MG/DL (ref 5–40)
WBC # UR STRIP: ABNORMAL /HPF
WBC NRBC COR # BLD AUTO: 5.18 10*3/MM3 (ref 3.4–10.8)

## 2024-12-23 PROCEDURE — 36415 COLL VENOUS BLD VENIPUNCTURE: CPT

## 2024-12-23 PROCEDURE — 80053 COMPREHEN METABOLIC PANEL: CPT

## 2024-12-23 PROCEDURE — 80061 LIPID PANEL: CPT

## 2024-12-23 PROCEDURE — 81001 URINALYSIS AUTO W/SCOPE: CPT

## 2024-12-23 PROCEDURE — 87086 URINE CULTURE/COLONY COUNT: CPT

## 2024-12-23 PROCEDURE — 85025 COMPLETE CBC W/AUTO DIFF WBC: CPT

## 2024-12-23 PROCEDURE — 83036 HEMOGLOBIN GLYCOSYLATED A1C: CPT

## 2024-12-23 PROCEDURE — 84443 ASSAY THYROID STIM HORMONE: CPT

## 2024-12-24 LAB — BACTERIA SPEC AEROBE CULT: NORMAL

## 2025-01-15 ENCOUNTER — TELEPHONE (OUTPATIENT)
Dept: FAMILY MEDICINE CLINIC | Facility: CLINIC | Age: 30
End: 2025-01-15
Payer: COMMERCIAL

## 2025-01-15 NOTE — TELEPHONE ENCOUNTER
Patient is wanting to know if her appt on 1/21/25 could be changed to a mychart video visit instead. Patient aware we will call her back after asking Shamika

## 2025-01-21 ENCOUNTER — TELEMEDICINE (OUTPATIENT)
Dept: FAMILY MEDICINE CLINIC | Facility: CLINIC | Age: 30
End: 2025-01-21
Payer: COMMERCIAL

## 2025-01-21 DIAGNOSIS — E10.9 TYPE 1 DIABETES MELLITUS WITHOUT COMPLICATION: ICD-10-CM

## 2025-01-21 DIAGNOSIS — F41.9 ANXIETY: Primary | ICD-10-CM

## 2025-01-21 PROCEDURE — 99214 OFFICE O/P EST MOD 30 MIN: CPT

## 2025-01-21 RX ORDER — SERTRALINE HYDROCHLORIDE 100 MG/1
100 TABLET, FILM COATED ORAL DAILY
Qty: 90 TABLET | Refills: 1 | Status: SHIPPED | OUTPATIENT
Start: 2025-01-21

## 2025-01-21 NOTE — PROGRESS NOTES
Yamilet Dempsey presents to [facility] who presents via telehealth for follow-up.    You have chosen to receive care through a telephone visit. Do you consent to use a telephone visit for your medical care today? YES    Patient is present in her home alone, I am present in the office in a patient room alone.    This was an audio and video enabled telemedicine encounter.     History of Present Illness  This is a 29-year-old female who presents via telehealth for follow-up.    Anxiety/depression: Patient states that she feels like she is doing really well on the Zoloft, the only issue she is having as she feels like when it is getting to the end of her medication, she feels like it almost is wearing off.  Patient states that she has even switched taking her medication from at night to the middle of the day, but when she gets to where she needs to almost take another dosage it almost feels like it wears off several hours before it is time for her next dose.  She is wondering if an increase in dose may help correct that.  She is doing well the medication without any side effects.  Denies suicidal thoughts or ideation.    Diabetes mellitus type 1: Patient states that she is doing pretty good in regards to this and her endocrinologist recently adjusted her medications.  No new issues.    Did review patient's previous blood work and did note that her kidney function was slightly elevated along with her urinalysis slightly abnormal, we will go ahead and repeat this prior to patient losing insurance at the end of the month.  Denies any issues.  States that she might of been dehydrated when she got her blood work done that day.    Patient otherwise has no other acute complaints and is otherwise doing well.    The following portions of the patient's history were personally reviewed and updated as appropriate: allergies, current medications, past medical history, past surgical history, past  family history, and past social history.       Objective     All labs, imaging, test results, and specialty provider notes reviewed with patient.     Physical Exam  Constitutional:       Appearance: Normal appearance.   Pulmonary:      Effort: Pulmonary effort is normal.   Musculoskeletal:         General: Normal range of motion.   Skin:     General: Skin is warm and dry.   Neurological:      General: No focal deficit present.      Mental Status: She is alert and oriented to person, place, and time.   Psychiatric:         Mood and Affect: Mood normal.         Behavior: Behavior normal.         Thought Content: Thought content normal.         Judgment: Judgment normal.                         Assessment and Plan:  Diagnoses and all orders for this visit:    1. Anxiety (Primary)  Comments:  Doing well on Zoloft, but will increase dose to compensate for wearing off early.  Strict ER precautions for suicidal thoughts or ideation.  Orders:  -     sertraline (Zoloft) 100 MG tablet; Take 1 tablet by mouth Daily.  Dispense: 90 tablet; Refill: 1    2. Type 1 diabetes mellitus without complication  Comments:  Will repeat kidney function and urinalysis, repeat A1c.  Management per endocrine  Orders:  -     Comprehensive Metabolic Panel; Future  -     Urinalysis With Culture If Indicated -; Future  -     Hemoglobin A1c; Future          Follow Up:  Return in about 6 months (around 7/21/2025).    Patient was given instructions and counseling regarding her condition or for health maintenance advice. Please see specific information pulled into the AVS if appropriate.     Total Time Spent: 20 minutes

## 2025-04-03 ENCOUNTER — APPOINTMENT (OUTPATIENT)
Dept: GENERAL RADIOLOGY | Facility: HOSPITAL | Age: 30
End: 2025-04-03
Payer: COMMERCIAL

## 2025-04-03 ENCOUNTER — APPOINTMENT (OUTPATIENT)
Dept: CT IMAGING | Facility: HOSPITAL | Age: 30
End: 2025-04-03
Payer: COMMERCIAL

## 2025-04-03 ENCOUNTER — HOSPITAL ENCOUNTER (EMERGENCY)
Facility: HOSPITAL | Age: 30
Discharge: HOME | End: 2025-04-03
Attending: EMERGENCY MEDICINE
Payer: COMMERCIAL

## 2025-04-03 VITALS
WEIGHT: 161.38 LBS | TEMPERATURE: 98.2 F | BODY MASS INDEX: 28.59 KG/M2 | RESPIRATION RATE: 18 BRPM | SYSTOLIC BLOOD PRESSURE: 109 MMHG | HEIGHT: 63 IN | DIASTOLIC BLOOD PRESSURE: 77 MMHG | HEART RATE: 78 BPM | OXYGEN SATURATION: 100 %

## 2025-04-03 DIAGNOSIS — M79.604 RIGHT LEG PAIN: ICD-10-CM

## 2025-04-03 DIAGNOSIS — Z91.81 HISTORY OF RECENT FALL: Primary | ICD-10-CM

## 2025-04-03 DIAGNOSIS — M54.2 NECK PAIN: ICD-10-CM

## 2025-04-03 LAB
HOLD SPECIMEN: NORMAL
HOLD SPECIMEN: NORMAL
WHOLE BLOOD HOLD COAG: NORMAL
WHOLE BLOOD HOLD SPECIMEN: NORMAL

## 2025-04-03 PROCEDURE — 70450 CT HEAD/BRAIN W/O DYE: CPT

## 2025-04-03 PROCEDURE — 25010000002 ONDANSETRON PER 1 MG

## 2025-04-03 PROCEDURE — 99285 EMERGENCY DEPT VISIT HI MDM: CPT

## 2025-04-03 PROCEDURE — 96375 TX/PRO/DX INJ NEW DRUG ADDON: CPT

## 2025-04-03 PROCEDURE — 72125 CT NECK SPINE W/O DYE: CPT

## 2025-04-03 PROCEDURE — 73590 X-RAY EXAM OF LOWER LEG: CPT

## 2025-04-03 PROCEDURE — 25510000001 IOPAMIDOL PER 1 ML: Performed by: EMERGENCY MEDICINE

## 2025-04-03 PROCEDURE — 73562 X-RAY EXAM OF KNEE 3: CPT

## 2025-04-03 PROCEDURE — 71260 CT THORAX DX C+: CPT

## 2025-04-03 PROCEDURE — 73030 X-RAY EXAM OF SHOULDER: CPT

## 2025-04-03 PROCEDURE — 96374 THER/PROPH/DIAG INJ IV PUSH: CPT

## 2025-04-03 PROCEDURE — 25010000002 MORPHINE PER 10 MG: Performed by: EMERGENCY MEDICINE

## 2025-04-03 RX ORDER — SODIUM CHLORIDE 0.9 % (FLUSH) 0.9 %
10 SYRINGE (ML) INJECTION AS NEEDED
Status: DISCONTINUED | OUTPATIENT
Start: 2025-04-03 | End: 2025-04-03 | Stop reason: HOSPADM

## 2025-04-03 RX ORDER — CYCLOBENZAPRINE HCL 5 MG
5 TABLET ORAL 3 TIMES DAILY PRN
Qty: 15 TABLET | Refills: 0 | Status: SHIPPED | OUTPATIENT
Start: 2025-04-03

## 2025-04-03 RX ORDER — IOPAMIDOL 755 MG/ML
100 INJECTION, SOLUTION INTRAVASCULAR
Status: COMPLETED | OUTPATIENT
Start: 2025-04-03 | End: 2025-04-03

## 2025-04-03 RX ORDER — ONDANSETRON 2 MG/ML
4 INJECTION INTRAMUSCULAR; INTRAVENOUS ONCE
Status: COMPLETED | OUTPATIENT
Start: 2025-04-03 | End: 2025-04-03

## 2025-04-03 RX ORDER — MORPHINE SULFATE 2 MG/ML
2 INJECTION, SOLUTION INTRAMUSCULAR; INTRAVENOUS ONCE
Status: COMPLETED | OUTPATIENT
Start: 2025-04-03 | End: 2025-04-03

## 2025-04-03 RX ORDER — DICLOFENAC SODIUM 75 MG/1
75 TABLET, DELAYED RELEASE ORAL 2 TIMES DAILY
Qty: 14 TABLET | Refills: 0 | Status: SHIPPED | OUTPATIENT
Start: 2025-04-03 | End: 2025-04-10

## 2025-04-03 RX ADMIN — ONDANSETRON 4 MG: 2 INJECTION INTRAMUSCULAR; INTRAVENOUS at 17:52

## 2025-04-03 RX ADMIN — MORPHINE SULFATE 2 MG: 2 INJECTION, SOLUTION INTRAMUSCULAR; INTRAVENOUS at 17:52

## 2025-04-03 RX ADMIN — IOPAMIDOL 100 ML: 755 INJECTION, SOLUTION INTRAVENOUS at 17:20

## 2025-04-03 NOTE — ED PROVIDER NOTES
"Time: 4:58 PM EDT  Date of encounter:  4/3/2025  Independent Historian/Clinical History and Information was obtained by:   Patient    History is limited by: N/A    Chief Complaint: Recent fall      History of Present Illness:  Patient is a 30 y.o. year old female who presents to the emergency department for evaluation of recent fall. States that she fell down 16 steps, head first, rolled twice. Reports that she did hit her head but denies any LOC. She is ambulatory and initially went to [facility], but recommended for her to come to the ED for further evaluation. Reports pain in her right lower leg and shoulder. 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     Patient Care Team  Primary Care Provider: Shamika Rodriguez APRN    Past Medical History:     No Known Allergies  Past Medical History:   Diagnosis Date    Diabetes mellitus      Past Surgical History:   Procedure Laterality Date     SECTION  2015    DR [place]     Family History   Problem Relation Age of Onset    Liver cancer Mother          AGE 32    Cancer Mother         Liver cancer    Breast cancer Maternal Grandmother     Diabetes Maternal Grandmother     Cancer Maternal Grandmother         Breast cancer    Diabetes Maternal Grandfather     Cancer Sister         Cervical cancer       Home Medications:  Prior to Admission medications    Medication Sig Start Date End Date Taking? Authorizing Provider   B-D INS SYR ULTRAFINE 1CC/30G 30G X 1/2\" 1 ML misc  10/7/21   Orly Cuenca MD   Continuous Blood Gluc Sensor (Dexcom G6 Sensor)  10/16/21   Orly Cuenca MD   Glucagon, rDNA, (Glucagon Emergency) 1 MG kit INJECT 1 MG INTO THE SKIN ONCE AS NEEDED 22   Orly Cuenca MD   glucose blood (OneTouch Verio) test strip To test 10 x day - 3 month supply Use as instructed. Dispense brand covered by insurance. 22   Orly Cuenca MD   Insulin " "Disposable Pump (OmniPod Dash 5 Pack Pods) misc  6/15/21   Orly Cuenca MD   insulin lispro (humaLOG) 100 UNIT/ML injection  6/15/21   Orly Cuenca MD   Lancets (OneTouch Delica Plus [id number]) misc TEST 10 TIMES A DAY 5/4/22   Orly Cuenca MD   OneTouch Verio test strip TEST 10 TIMES A DAY 5/4/22   Orly Cuenca MD   sertraline (Zoloft) 100 MG tablet Take 1 tablet by mouth Daily. 1/21/25   Shamika Rodriguez APRN        Social History:   Social History     Tobacco Use    Smoking status: Never    Smokeless tobacco: Never   Vaping Use    Vaping status: Never Used   Substance Use Topics    Alcohol use: Never    Drug use: Never         Review of Systems:  Review of Systems   Constitutional:  Negative for chills and fever.   HENT:  Negative for ear pain.    Eyes:  Negative for pain.   Respiratory:  Negative for cough and shortness of breath.    Cardiovascular:  Negative for chest pain.   Gastrointestinal:  Negative for abdominal pain, diarrhea, nausea and vomiting.   Genitourinary:  Negative for dysuria.   Musculoskeletal:  Positive for arthralgias.   Skin:  Negative for rash.   Neurological:  Negative for headaches.        Physical Exam:  /77   Pulse 78   Temp 98.2 °F (36.8 °C) (Oral)   Resp 18   Ht 160 cm (63\")   Wt 73.2 kg (161 lb 6 oz)   SpO2 100%   BMI 28.59 kg/m²     Physical Exam  Vitals and nursing note reviewed.   Constitutional:       General: She is not in acute distress.     Appearance: Normal appearance.   HENT:      Head: Normocephalic and atraumatic.      Nose: Nose normal.      Mouth/Throat:      Mouth: Mucous membranes are moist.   Eyes:      Pupils: Pupils are equal, round, and reactive to light.   Cardiovascular:      Rate and Rhythm: Normal rate and regular rhythm.      Pulses: Normal pulses.      Heart sounds: Normal heart sounds.   Pulmonary:      Effort: Pulmonary effort is normal.      Breath sounds: Normal breath sounds.   Abdominal:      General: " Abdomen is flat. Bowel sounds are normal.      Palpations: Abdomen is soft.   Musculoskeletal:      Cervical back: Normal range of motion.      Right lower leg: No edema.      Left lower leg: No edema.      Comments: +Bruising to the right lower leg.    No tenderness and Full ROM to all major joints including the ankle, knees, hips, shoulders, elbows, and wrists.   No focal tenderness to the cervical, thoracic, and lumbar spine.       Skin:     General: Skin is warm and dry.      Capillary Refill: Capillary refill takes less than 2 seconds.   Neurological:      General: No focal deficit present.      Mental Status: She is alert.   Psychiatric:         Mood and Affect: Mood normal.         Behavior: Behavior normal.                    Medical Decision Making:      Comorbidities that affect care:    DM    External Notes reviewed:    None      The following orders were placed and all results were independently analyzed by me:  Orders Placed This Encounter   Procedures    XR Shoulder 2+ View Right    XR Knee 3 View Left    XR Tibia Fibula 2 View Right    CT Head Without Contrast    CT Cervical Spine Without Contrast    CT Chest With Contrast Diagnostic    [facility] Draw    Insert peripheral IV    Green Top (Gel)    Lavender Top    Gold Top - SST    Light Blue Top       Medications Given in the Emergency Department:  Medications   sodium chloride 0.9 % flush 10 mL (has no administration in time range)   morphine injection 2 mg (2 mg Intravenous Given 4/3/25 1752)   ondansetron (ZOFRAN) injection 4 mg (4 mg Intravenous Given 4/3/25 1752)   iopamidol (ISOVUE-370) 76 % injection 100 mL (100 mL Intravenous Given 4/3/25 1720)        ED Course:    ED Course as of 04/03/25 1950   Thu Apr 03, 2025   1733 CT Head Without Contrast  Negative [MV]   1733 CT Cervical Spine Without Contrast  Negative [MV]      ED Course User Index  [MV] Vickey Bower PA       Labs:    Lab Results (last 24 hours)       ** No results found for the last 24  hours. **             Imaging:    XR Tibia Fibula 2 View Right  Result Date: 4/3/2025  XR TIBIA FIBULA 2 VW RIGHT Date of Exam: 4/3/2025 5:29 PM EDT Indication: fall Comparison: None available. FINDINGS:  No fracture is identified. Mineralization and alignment appear within normal limits. There appears to be anterior and lateral soft tissue edema of the mid lower leg.     1.Anterior and lateral soft tissue edema of the mid lower leg. 2.No radiographic findings of acute osseous abnormality. Electronically Signed: Albert Pruitt  4/3/2025 6:24 PM EDT  Workstation ID: [id number]    XR Shoulder 2+ View Right  Result Date: 4/3/2025  XR SHOULDER 2+ VW RIGHT Date of Exam: 4/3/2025 5:29 PM EDT Indication: fall Comparison: None available. FINDINGS:  No fracture is identified. Mineralization and alignment appear within normal limits.  Soft tissues appear unremarkable.     No radiographic findings of acute osseous abnormality. Electronically Signed: Albert Pruitt  4/3/2025 6:15 PM EDT  Workstation ID: [id number]    CT Chest With Contrast Diagnostic  Result Date: 4/3/2025  CT CHEST W CONTRAST DIAGNOSTIC Date of Exam: 4/3/2025 5:20 PM EDT Indication: fell 16 steps, rib pain, dyspnea. Comparison: None available. Technique: Axial CT images were obtained of the chest after the uneventful intravenous administration of iodinated contrast.  Reconstructed coronal and sagittal images were also obtained. Automated exposure control and iterative construction methods were  used. Findings: The thyroid gland is normal. The subglottic airway is clear. No evidence of aortic dissection. No pulmonary embolus. The heart and pericardium are normal. No adenopathy. No consolidation. No pneumothorax or pleural effusion. No mediastinal, perihilar, or axillary adenopathy. Calcified granulomata. No rib fractures are seen Limited evaluation of the upper abdomen appears normal. Thoracic vertebral body height and alignment are normal. The intervertebral disc  spaces are maintained. Thoracic posterior elements are intact. The sternum is intact. Thoracic spinous processes are intact. The transverse processes are intact. The visualized clavicles are intact. The proximal humeri are intact.     No acute cardiopulmonary disease. No rib fractures. Electronically Signed: Justo Bui MD  4/3/2025 5:57 PM EDT  Workstation ID: [id number]    XR Knee 3 View Left  Result Date: 4/3/2025  XR KNEE 3 VW LEFT Date of Exam: 4/3/2025 5:21 PM EDT Indication: fall Comparison: None available. FINDINGS:  No fracture is identified. Alignment appear within normal limits. 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 Mild anterior soft tissue prominence.  Joint spaces appear preserved. No definite joint effusion.     1.Mild anterior soft tissue prominence. 2.No radiographic findings of acute osseous knee abnormality. Electronically Signed: Albert Sonal  4/3/2025 5:45 PM EDT  Workstation ID: [id number]    CT Head Without Contrast  Result Date: 4/3/2025  CT HEAD WO CONTRAST, CT CERVICAL SPINE WO CONTRAST Date of Exam: 4/3/2025 5:07 PM EDT Indication: fell 16 steps, +head trauma. Comparison: None available. Technique: Axial CT images were obtained of the head without contrast administration.  Reconstructed coronal and sagittal images were also obtained. Automated exposure control and iterative construction methods were used. Findings: CT head: There is no evidence of acute territorial infarction. There is no acute intracranial hemorrhage. There are no extra-axial collections. Ventricles and CSF spaces are symmetric. No mass effect nor hydrocephalus. Brain parenchyma appears normal for age.  Paranasal sinuses and mastoid air cells are adequately aerated.  Osseous structures and orbits appear intact. CT cervical spine: OSSEOUS STRUCTURES: No fracture nor bony destructive process. ALIGNMENT:  Normal DISC SPACE: Normal  JOINTS: No significant arthropathy. SOFT TISSUES:  Unremarkable LUNG APICES: Unremarkable LEVELS: No significant osseous central canal nor foraminal stenosis identified at any level.     Impression: 1. No acute intracranial abnormality. 2. No acute CT abnormality of the cervical spine. Electronically Signed: Cherise Kimball MD  4/3/2025 5:31 PM EDT  Workstation ID: [id number]    CT Cervical Spine Without Contrast  Result Date: 4/3/2025  CT HEAD WO CONTRAST, CT CERVICAL SPINE WO CONTRAST Date of Exam: 4/3/2025 5:07 PM EDT Indication: fell 16 steps, +head trauma. Comparison: None available. Technique: Axial CT images were obtained of the head without contrast administration.  Reconstructed coronal and sagittal images were also obtained. Automated exposure control and iterative construction methods were used. Findings: CT head: There is no evidence of acute territorial infarction. There is no acute intracranial hemorrhage. There are no extra-axial collections. Ventricles and CSF spaces are symmetric. No mass effect nor hydrocephalus. Brain parenchyma appears normal for age.  Paranasal sinuses and mastoid air cells are adequately aerated.  Osseous structures and orbits appear intact. CT cervical spine: OSSEOUS STRUCTURES: No fracture nor bony destructive process. ALIGNMENT:  Normal DISC SPACE: Normal JOINTS: No significant arthropathy. SOFT TISSUES:  Unremarkable LUNG APICES: Unremarkable LEVELS: No significant osseous central canal nor foraminal stenosis identified at any level.     Impression: 1. No acute intracranial abnormality. 2. No acute CT abnormality of the cervical spine. Electronically Signed: Cherise Kimball MD  4/3/2025 5:31 PM EDT  Workstation ID: [id number]        Differential Diagnosis and Discussion:    Trauma:  Differential diagnosis considered but not limited to were subarachnoid hemorrhage, intracranial bleeding, pneumothorax, cardiac contusion, lung contusion, intra-abdominal bleeding, and compartment  syndrome of any extremity or other significant traumatic pathology    PROCEDURES:    X-ray were performed in the emergency department and all X-ray impressions were independently interpreted by me.  CT scan was performed in the emergency department and the CT scan radiology impression was interpreted by me.    No orders to display       Procedures    MDM     Amount and/or Complexity of Data Reviewed  Tests in the radiology section of CPT®: reviewed    Risk of Complications, Morbidity, and/or Mortality  Presenting problems: moderate  Diagnostic procedures: moderate  Management options: low    Patient Progress  Patient progress: stable    Patient presents to the emergency department for evaluation of recent fall. States that she fell down 16 steps, head first, rolled twice. Reports that she did hit her head but denies any LOC. She is ambulatory and initially went to [facility], but recommended for her to come to the ED for further evaluation. Reports pain in her right lower leg and shoulder. 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     On exam  +Bruising to the right lower leg.  No tenderness and Full ROM to all major joints including the ankle, knees, hips, shoulders, elbows, and wrists.   No focal tenderness to the cervical, thoracic, and lumbar spine.    XR Shoulder 2+ View Right   Final Result   No radiographic findings of acute osseous abnormality.      Electronically Signed: Albert Sonal     4/3/2025 6:15 PM EDT     Workstation ID: [id number]      XR Tibia Fibula 2 View Right   Final Result   1.Anterior and lateral soft tissue edema of the mid lower leg.   2.No radiographic findings of acute osseous abnormality.            Electronically Signed: Albert Sonal     4/3/2025 6:24 PM EDT     Workstation ID: [id number]      XR Knee 3 View Left   Final Result   1.Mild anterior soft tissue prominence.   2.No radiographic findings of acute osseous knee  abnormality.            Electronically Signed: Albert Pruitt     4/3/2025 5:45 PM EDT     Workstation ID: [id number]      CT Chest With Contrast Diagnostic   Final Result   No acute cardiopulmonary disease. No rib fractures.         Electronically Signed: Justo Bui MD     4/3/2025 5:57 PM EDT     Workstation ID: [id number]      CT Head Without Contrast   Final Result   Impression:      1. No acute intracranial abnormality.   2. No acute CT abnormality of the cervical spine.            Electronically Signed: Cherise Kimball MD     4/3/2025 5:31 PM EDT     Workstation ID: [id number]      CT Cervical Spine Without Contrast   Final Result   Impression:      1. No acute intracranial abnormality.   2. No acute CT abnormality of the cervical spine.            Electronically Signed: Cherise Kimball MD     4/3/2025 5:31 PM EDT     Workstation ID: [id number]        Discussed the imaging findings with the patient.    Discussed with the patient that she will have more pain and symptoms tomorrow.  Will discharge the patient with diclofenac and Flexeril for pain.    Follow up with your Primary Care Provider in 3-5 days especially if symptoms worsen.            Patient Care Considerations:    CONSULT: I considered consulting orthopedics, however there are no acute bony fractures.      Consultants/Shared Management Plan:    None    Social Determinants of Health:    Patient is independent, reliable, and has access to care.       Disposition and Care Coordination:    Discharged: The patient is suitable and stable for discharge with no need for consideration of admission.    I have explained the patient´s condition, diagnoses and treatment plan based on the information available to me at this time. I have answered questions and addressed any concerns. The patient has a good  understanding of the patient´s diagnosis, condition, and treatment plan as can be expected at this point. The vital signs have been stable. The patient´s condition is  stable and appropriate for discharge from the emergency department.      The patient will pursue further outpatient evaluation with the primary care physician or other designated or consulting physician as outlined in the discharge instructions. They are agreeable to this plan of care and follow-up instructions have been explained in detail. The patient has received these instructions in written format and has expressed an understanding of the discharge instructions. The patient is aware that any significant change in condition or worsening of symptoms should prompt an immediate return to this or the closest emergency department or call to 911.  I have explained discharge medications and the need for follow up with the patient/caretakers. This was also printed in the discharge instructions. Patient was discharged with the following medications and follow up:      Medication List        New Prescriptions      cyclobenzaprine 5 MG tablet  Commonly known as: FLEXERIL  Take 1 tablet by mouth 3 (Three) Times a Day As Needed for Muscle Spasms (pain).     diclofenac 75 MG EC tablet  Commonly known as: VOLTAREN  Take 1 tablet by mouth 2 (Two) Times a Day for 7 days.               Where to Get Your Medications        These medications were sent to [facility] [id number] - [place] - [address] AT [facility] [address] - [phone]  - [phone]   [address], [place] [address]      Phone: [phone]   cyclobenzaprine 5 MG tablet  diclofenac 75 MG EC tablet      No follow-up provider specified.     Final diagnoses:   History of recent fall   Neck pain   Right leg pain        ED Disposition       ED Disposition   Discharge    Condition   Stable    Comment   --               This medical record created using voice recognition software.             Vickey Bower PA  04/03/25 1950

## 2025-04-03 NOTE — DISCHARGE INSTRUCTIONS
Imaging of your head, neck, right leg, right shoulder, left knee are all negative for any acute fractures or malalignment.  As discussed, you may have worsening symptoms and soreness tomorrow.  You are being discharged home with diclofenac and Flexeril for pain. Take your Diclofenac with meals. Do not take with other NSAIDs such as advil and aleve. Take your flexeril at night first since this may cause drowsiness.    Follow up with your Primary Care Provider in 3-5 days especially if symptoms worsen.    Return to the Emergency Department if you develop any uncontrollable fever, intractable pain, nausea, vomiting.

## 2025-05-01 ENCOUNTER — TELEPHONE (OUTPATIENT)
Dept: OBSTETRICS AND GYNECOLOGY | Age: 30
End: 2025-05-01

## 2025-05-01 DIAGNOSIS — O36.80X0 PREGNANCY WITH INCONCLUSIVE FETAL VIABILITY, SINGLE OR UNSPECIFIED FETUS: Primary | ICD-10-CM

## 2025-05-01 NOTE — TELEPHONE ENCOUNTER
Established patient with +HPT. LMP 3/30/25. Scheduled New OB with Dr. Lebron on 6/10/25. Further recommendations/orders?

## 2025-05-02 NOTE — TELEPHONE ENCOUNTER
Established patient with a + home pregnancy test, LMP 3/30. New Ob appointment is scheduled with Dr. Lebron on 6/10, viability ultrasound and follow up is scheduled 5/16. Please sign the attached order.     Perla, please notify patient.

## 2025-05-08 ENCOUNTER — TELEPHONE (OUTPATIENT)
Dept: OBSTETRICS AND GYNECOLOGY | Age: 30
End: 2025-05-08

## 2025-05-08 NOTE — TELEPHONE ENCOUNTER
Provider: Edgar Lebron MD     Caller: Yamilet Dempsey  Female, 30 y.o., 1995  MRN: [id number]  [facility]: [id number]  Phone: [phone]    Relationship to Patient: SELF          Reason for Call: PT REQ STATEMENT TO [facility]S DENTAL OFFICE TO PLACE FILLINGS    DENTAL OFFICE- [facility]  [phone] PHONE NUMBER          PT REQ A CALL BACK TO BE ADVISED REGARDING IF IT IS SAFE FOR HER TO HAVE AN XRAY WHILE PREGNANT

## 2025-05-16 ENCOUNTER — OFFICE VISIT (OUTPATIENT)
Dept: OBSTETRICS AND GYNECOLOGY | Age: 30
End: 2025-05-16
Payer: COMMERCIAL

## 2025-05-16 VITALS
BODY MASS INDEX: 28.53 KG/M2 | DIASTOLIC BLOOD PRESSURE: 78 MMHG | WEIGHT: 161 LBS | HEART RATE: 94 BPM | SYSTOLIC BLOOD PRESSURE: 131 MMHG | HEIGHT: 63 IN

## 2025-05-16 DIAGNOSIS — E10.9 TYPE 1 DIABETES MELLITUS WITHOUT COMPLICATION: ICD-10-CM

## 2025-05-16 DIAGNOSIS — Z32.00 ENCOUNTER FOR CONFIRMATION OF PREGNANCY TEST RESULT WITH PHYSICAL EXAMINATION: Primary | ICD-10-CM

## 2025-05-16 PROBLEM — Z37.9 NORMAL LABOR: Status: RESOLVED | Noted: 2022-10-25 | Resolved: 2025-05-16

## 2025-05-16 PROBLEM — R22.30 SHOULDER MASS: Status: RESOLVED | Noted: 2023-01-24 | Resolved: 2025-05-16

## 2025-05-16 PROBLEM — O09.90 SUPERVISION OF HIGH RISK PREGNANCY, ANTEPARTUM: Status: RESOLVED | Noted: 2022-03-28 | Resolved: 2025-05-16

## 2025-05-16 PROBLEM — Z3A.37 37 WEEKS GESTATION OF PREGNANCY: Status: RESOLVED | Noted: 2022-10-25 | Resolved: 2025-05-16

## 2025-05-16 LAB — HBA1C MFR BLD: 6.7 % (ref 4.8–5.6)

## 2025-05-16 PROCEDURE — 83036 HEMOGLOBIN GLYCOSYLATED A1C: CPT | Performed by: OBSTETRICS & GYNECOLOGY

## 2025-05-16 RX ORDER — INSULIN ASPART 100 [IU]/ML
INJECTION, SOLUTION INTRAVENOUS; SUBCUTANEOUS
COMMUNITY
Start: 2025-04-15

## 2025-05-16 RX ORDER — PRENATAL WITH FERROUS FUM AND FOLIC ACID 3080; 920; 120; 400; 22; 1.84; 3; 20; 10; 1; 12; 200; 27; 25; 2 [IU]/1; [IU]/1; MG/1; [IU]/1; MG/1; MG/1; MG/1; MG/1; MG/1; MG/1; UG/1; MG/1; MG/1; MG/1; MG/1
1 TABLET ORAL DAILY
Qty: 30 TABLET | Refills: 1 | Status: SHIPPED | OUTPATIENT
Start: 2025-05-16

## 2025-05-16 NOTE — ASSESSMENT & PLAN NOTE
Today's ultrasound findings were reviewed.  Overall reassuring.  There is a carter intrauterine gestation with cardiac activity.  Baby measures 6 weeks and 1 day which is consistent with the patient's LMP derived EDC of 1/4/2026.  The patient needs to start prenatal vitamins ASAP.  SAB warnings discussed

## 2025-05-16 NOTE — PROGRESS NOTES
"[facility]  Gynecological Visit    CC: Follow-up ultrasound    Subjective:   30 y.o. who presents in follow-up of a pelvic ultrasound to confirm pregnancy.  The patient states she is having some nausea but no significant vomiting.  She reports that when she first found out she was pregnant her blood sugars were not well-controlled, but she has been doing much better and then over the last 4 weeks she states about 86% of her blood sugars are within range.  She reports the majority of her out of range blood sugars are low.  She is not taking a prenatal vitamin yet    History:   Past medical history, medications, allergies, surgical history, social history, and obstetrical history all reviewed and updated.    Last Completed Pap Smear    This patient has no relevant Health Maintenance data.       Objective:/78   Pulse 94   Ht 160 cm (63\")   Wt 73 kg (161 lb)   LMP 2025 (Approximate)   BMI 28.52 kg/m²     Physical Exam  Vitals and nursing note reviewed.   Constitutional:       General: She is not in acute distress.     Appearance: Normal appearance. She is not ill-appearing.   HENT:      Head: Normocephalic and atraumatic.   Abdominal:      General: Abdomen is flat. There is no distension.      Palpations: Abdomen is soft. There is no mass.      Tenderness: There is no abdominal tenderness. There is no guarding or rebound.      Hernia: No hernia is present.      Comments: The incisions are clean, dry, intact and well-healing.  There are no signs of infection around the incisions.   Musculoskeletal:         General: No swelling.      Right lower leg: No edema.      Left lower leg: No edema.   Neurological:      Mental Status: She is alert and oriented to person, place, and time.   Psychiatric:         Mood and Affect: Mood normal.         Behavior: Behavior normal.         Thought Content: Thought content normal.         Judgment: Judgment normal.       Assessment and " Plan:  Diagnoses and all orders for this visit:    1. Encounter for confirmation of pregnancy test result with physical examination (Primary)  Assessment & Plan:  Today's ultrasound findings were reviewed.  Overall reassuring.  There is a carter intrauterine gestation with cardiac activity.  Baby measures 6 weeks and 1 day which is consistent with the patient's LMP derived EDC of 1/4/2026.  The patient needs to start prenatal vitamins ASAP.  SAB warnings discussed      2. Type 1 diabetes mellitus without complication  Assessment & Plan:  The patient currently is using an insulin pump.  She states that her blood sugars were not well-controlled the time of conception.  She states that once she realized she was pregnant she began more tightly controlling her blood sugars and now the majority of her blood sugars are in range and most of her out of range values are on the low side.  I have discussed the importance of maintaining good glycemic control and the risks of having poorly controlled blood sugars including the risk of miscarriage and congenital anomalies.  Check A1c today.  Continue insulin pump at current settings.  Will need to see MFM early in pregnancy.  Plan for close interval follow-up.    Orders:  -     Hemoglobin A1c; Future  -     Hemoglobin A1c        Follow Up:  Return in about 2 weeks (around 5/30/2025) for new ob.    Edgar Lebron MD  05/16/2025

## 2025-05-16 NOTE — ASSESSMENT & PLAN NOTE
The patient currently is using an insulin pump.  She states that her blood sugars were not well-controlled the time of conception.  She states that once she realized she was pregnant she began more tightly controlling her blood sugars and now the majority of her blood sugars are in range and most of her out of range values are on the low side.  I have discussed the importance of maintaining good glycemic control and the risks of having poorly controlled blood sugars including the risk of miscarriage and congenital anomalies.  Check A1c today.  Continue insulin pump at current settings.  Will need to see MFM early in pregnancy.  Plan for close interval follow-up.

## 2025-05-19 ENCOUNTER — TELEPHONE (OUTPATIENT)
Dept: OBSTETRICS AND GYNECOLOGY | Age: 30
End: 2025-05-19
Payer: COMMERCIAL

## 2025-05-19 NOTE — TELEPHONE ENCOUNTER
Hub staff attempted to follow warm transfer process and was unsuccessful     Caller: Yamilet Dempsey    Relationship to patient: Self    Best call back number: [phone] (home)     Patient is needing: PT IS 7W1D PREGNANT. STARTED BLEEDING YESTERDAY (5/18/2025) PROGRESSIVELY GOTTEN WORSE, STARTED CRAMPING TODAY. SHE IS IN [place] AND WOULD LIKE A CALL TO DISCUSS WHAT TO DO,.   
States she had an ultrasound in office right before she left and started a couple of days after. Advised may be due to exam but if increasing or more cramping to local ER for evaluation. Recommended to get records and bring home with her if she goes.   
No

## 2025-05-21 ENCOUNTER — TELEPHONE (OUTPATIENT)
Dept: OBSTETRICS AND GYNECOLOGY | Age: 30
End: 2025-05-21
Payer: COMMERCIAL

## 2025-05-21 NOTE — TELEPHONE ENCOUNTER
Hub staff attempted to follow warm transfer process and was unsuccessful     Caller: Yamilet Dempsey    Relationship to patient: Self    Best call back number: [phone] CALL ANYTIME    Patient is needing: PLEASE SEE SIGNED TELEPHONE ENCOUNTER FROM 05/19/25.    PATIENT WENT TO EMERGENCY ROOM IN [place] ON MONDAY DUE TO SPOTTING. ULTRASOUND WAS PERFORMED AND MEASURED 6 WEEKS, 4 DAYS. PATIENT WAS TOLD PREGNANCY IS CONSIDERED THREATENED MISCARRIAGE. PATIENT WAS TOLD [facility] ULTRASOUND MACHINE IS NOT AS SENSITIVE AND USUALLY CAN NOT  HEART BEAT AT 6 WEEKS. HCG LEVERL WAS 1085. LMP 03/30.    STILL HAD BRIGHT, RED STRINGY BLOOD WITH CLOTS. DOES HAVE OCCASIONAL CRAMPING. CHANGING PAD TWICE PER DAY AND NOT FILLING PADS. NOTICES MORE BLOOD WHILE WIPING THAN MONDAY.       PATIENT IS REQUESTING TO SPEAK WITH CLINICAL TO DISCUSS WHAT  RECOMMENDS PATIENT DO UNTIL ARRIVING BACK IN [place] THURSDAY EVENING.

## 2025-05-28 ENCOUNTER — OFFICE VISIT (OUTPATIENT)
Dept: OBSTETRICS AND GYNECOLOGY | Age: 30
End: 2025-05-28
Payer: COMMERCIAL

## 2025-05-28 VITALS
WEIGHT: 162 LBS | DIASTOLIC BLOOD PRESSURE: 81 MMHG | HEART RATE: 99 BPM | SYSTOLIC BLOOD PRESSURE: 137 MMHG | HEIGHT: 63 IN | BODY MASS INDEX: 28.7 KG/M2

## 2025-05-28 DIAGNOSIS — O03.9 COMPLETE ABORTION: Primary | ICD-10-CM

## 2025-05-28 DIAGNOSIS — E10.9 TYPE 1 DIABETES MELLITUS WITHOUT COMPLICATION: ICD-10-CM

## 2025-05-28 PROBLEM — Z87.51 HISTORY OF PRETERM DELIVERY: Status: RESOLVED | Noted: 2022-07-05 | Resolved: 2025-05-28

## 2025-05-28 PROBLEM — O34.219 PREVIOUS CESAREAN DELIVERY AFFECTING PREGNANCY: Status: RESOLVED | Noted: 2022-03-28 | Resolved: 2025-05-28

## 2025-05-28 PROBLEM — Z32.00 ENCOUNTER FOR CONFIRMATION OF PREGNANCY TEST RESULT WITH PHYSICAL EXAMINATION: Status: RESOLVED | Noted: 2025-05-16 | Resolved: 2025-05-28

## 2025-05-28 PROBLEM — Z87.59 HISTORY OF PRECIPITOUS DELIVERY: Status: RESOLVED | Noted: 2022-07-05 | Resolved: 2025-05-28

## 2025-05-28 PROBLEM — O24.319 PREGESTATIONAL DIABETES MELLITUS, MODIFIED WHITE CLASS B: Status: RESOLVED | Noted: 2022-03-28 | Resolved: 2025-05-28

## 2025-05-28 LAB — HCG INTACT+B SERPL-ACNC: 11.6 MIU/ML

## 2025-05-28 PROCEDURE — 84702 CHORIONIC GONADOTROPIN TEST: CPT | Performed by: OBSTETRICS & GYNECOLOGY

## 2025-05-28 RX ORDER — PROCHLORPERAZINE 25 MG/1
SUPPOSITORY RECTAL
COMMUNITY
Start: 2025-05-22

## 2025-05-28 NOTE — ASSESSMENT & PLAN NOTE
We discussed the patient needs to have good control of her diabetes prior to pregnancy.  The patient reports that her blood sugars were poorly controlled at the time of conception.  When she began to more closely monitor her blood sugars they came down nicely.  Her last A1c was 6.7 but we did discuss the most important thing is having good control of her blood sugars day-to-day not just have an A1c below 7.  Continue to insulin pump therapy per her primary provider's recommendations

## 2025-05-28 NOTE — ASSESSMENT & PLAN NOTE
The patient's clinical history is compatible with a completed miscarriage.  Plan to check a beta-hCG today.  She reports that it was around the thousand when she went to the ER in [place].  I discussed with the patient that it will likely not be negative however it should be declining significantly.  Based on today's result then we can decide what her next follow-up should be.  Bleeding warnings discussed.  If the patient has not had a menstrual cycle within 6 weeks she should let me know  The patient does desire a future pregnancy as soon as possible.  Will plan to follow-up in 3 months as she has required from our in the past to achieve pregnancy.

## 2025-05-28 NOTE — PROGRESS NOTES
"[facility]  Gynecological Visit    CC: Follow-up miscarriage    Subjective:   30 y.o. who presents in follow-up of a spontaneous miscarriage.  Patient began having vaginal bleeding while she was on vacation in [place].  She was seen in the emergency department there.  Her bleeding continued and then she felt as though she passed the pregnancy.  She reports within 24 hours of passing the pregnancy her bleeding stopped.  She denies any pelvic cramping or vaginal bleeding since passing the pregnancy.  She does desire another pregnancy.    History:   Past medical history, medications, allergies, surgical history, social history, and obstetrical history all reviewed and updated.    Last Completed Pap Smear    This patient has no relevant Health Maintenance data.       Objective:/81   Pulse 99   Ht 160 cm (63\")   Wt 73.5 kg (162 lb)   LMP 2025 (Approximate)   Breastfeeding No   BMI 28.70 kg/m²     Physical Exam  Vitals and nursing note reviewed. Exam conducted with a chaperone present.   Constitutional:       General: She is not in acute distress.     Appearance: Normal appearance. She is not ill-appearing.   HENT:      Head: Normocephalic and atraumatic.   Genitourinary:     General: Normal vulva.      Exam position: Lithotomy position.      Labia:         Right: No rash, tenderness, lesion or injury.         Left: No rash, tenderness, lesion or injury.       Vagina: No signs of injury. No vaginal discharge, erythema, tenderness, bleeding, lesions or prolapsed vaginal walls.      Cervix: No cervical motion tenderness, discharge, friability, lesion, erythema or cervical bleeding.      Uterus: Not deviated, not enlarged and not fixed.       Adnexa:         Right: No mass, tenderness or fullness.          Left: No mass, tenderness or fullness.     Musculoskeletal:      Right lower leg: No edema.      Left lower leg: No edema.   Skin:     General: Skin is warm and dry.      " Findings: No rash.   Neurological:      Mental Status: She is alert and oriented to person, place, and time.   Psychiatric:         Mood and Affect: Mood normal.         Behavior: Behavior normal.         Thought Content: Thought content normal.         Judgment: Judgment normal.       Assessment and Plan:  Diagnoses and all orders for this visit:    1. Complete  (Primary)  Assessment & Plan:  The patient's clinical history is compatible with a completed miscarriage.  Plan to check a beta-hCG today.  She reports that it was around the thousand when she went to the ER in [place].  I discussed with the patient that it will likely not be negative however it should be declining significantly.  Based on today's result then we can decide what her next follow-up should be.  Bleeding warnings discussed.  If the patient has not had a menstrual cycle within 6 weeks she should let me know  The patient does desire a future pregnancy as soon as possible.  Will plan to follow-up in 3 months as she has required from our in the past to achieve pregnancy.    Orders:  -     hCG, Quantitative, Pregnancy    2. Type 1 diabetes mellitus without complication  Assessment & Plan:  We discussed the patient needs to have good control of her diabetes prior to pregnancy.  The patient reports that her blood sugars were poorly controlled at the time of conception.  When she began to more closely monitor her blood sugars they came down nicely.  Her last A1c was 6.7 but we did discuss the most important thing is having good control of her blood sugars day-to-day not just have an A1c below 7.  Continue to insulin pump therapy per her primary provider's recommendations          Counseling: TRACK MENSES, RTO if <q21 days (frequent) or >q3mo (infrequent IF not on hormonal BC), >7d long, heavy, or painful.      Follow Up:  Return in 3 months (on 2025) for Annual physical.    Edgar Lebron MD  2025

## 2025-07-14 ENCOUNTER — TELEPHONE (OUTPATIENT)
Dept: OBSTETRICS AND GYNECOLOGY | Age: 30
End: 2025-07-14
Payer: COMMERCIAL

## 2025-07-14 NOTE — TELEPHONE ENCOUNTER
Provider: DR. ALYSHA JHAVERI    Caller: Yamilet Dempsey    Relationship to Patient: Self    Pharmacy: [facility]    Phone Number: [phone]     Reason for Call: PATIENT NEWLY PREGNANT AFTER MISCARRIAGE    When was the patient last seen: 5/28/25    +HPT ON 7/9/25 AND LMP IS 6/16/25  PATIENT HAD A MISCARRIAGE AND WAS SCHEDULED FOR AN ANNUAL ON 8/26/25, BUT WAS INSTRUCTED TO CALL IF SHE GOT PREGNANT - PATIENT STATED SHE NORMALLY GETS AN ULTRASOUND.   AND ASKING ABOUT BEING PLACED ON PROGESTIN.     PATIENT IS SCHEDULED FOR A NEW OB APPT ON 8/16/25, PT ALSO STILL HAVE ANNUAL APPT FOR 8/29/25

## 2025-07-14 NOTE — TELEPHONE ENCOUNTER
Established patient with HX of MAB +HPT. LMP 6/16/25. Patient is scheduled for New OB with Dr. Lebron on 8/16/25. Further recommendations/order?

## 2025-07-15 DIAGNOSIS — O20.0 THREATENED ABORTION: Primary | ICD-10-CM

## 2025-07-16 ENCOUNTER — CLINICAL SUPPORT (OUTPATIENT)
Dept: OBSTETRICS AND GYNECOLOGY | Age: 30
End: 2025-07-16
Payer: COMMERCIAL

## 2025-07-16 DIAGNOSIS — E10.9 TYPE 1 DIABETES MELLITUS WITHOUT COMPLICATION: ICD-10-CM

## 2025-07-16 DIAGNOSIS — O20.0 THREATENED ABORTION: ICD-10-CM

## 2025-07-16 LAB
ALBUMIN SERPL-MCNC: 4.7 G/DL (ref 3.5–5.2)
ALBUMIN/GLOB SERPL: 1.4 G/DL
ALP SERPL-CCNC: 89 U/L (ref 39–117)
ALT SERPL W P-5'-P-CCNC: 17 U/L (ref 1–33)
ANION GAP SERPL CALCULATED.3IONS-SCNC: 14 MMOL/L (ref 5–15)
AST SERPL-CCNC: 39 U/L (ref 1–32)
BILIRUB SERPL-MCNC: 0.2 MG/DL (ref 0–1.2)
BUN SERPL-MCNC: 7 MG/DL (ref 6–20)
BUN/CREAT SERPL: 7.9 (ref 7–25)
CALCIUM SPEC-SCNC: 10 MG/DL (ref 8.6–10.5)
CHLORIDE SERPL-SCNC: 100 MMOL/L (ref 98–107)
CO2 SERPL-SCNC: 22 MMOL/L (ref 22–29)
CREAT SERPL-MCNC: 0.89 MG/DL (ref 0.57–1)
EGFRCR SERPLBLD CKD-EPI 2021: 88.5 ML/MIN/1.73
GLOBULIN UR ELPH-MCNC: 3.3 GM/DL
GLUCOSE SERPL-MCNC: 127 MG/DL (ref 65–99)
HBA1C MFR BLD: 7.1 % (ref 4.8–5.6)
HCG INTACT+B SERPL-ACNC: 237 MIU/ML
POTASSIUM SERPL-SCNC: 4.2 MMOL/L (ref 3.5–5.2)
PROT SERPL-MCNC: 8 G/DL (ref 6–8.5)
SODIUM SERPL-SCNC: 136 MMOL/L (ref 136–145)

## 2025-07-16 PROCEDURE — 84702 CHORIONIC GONADOTROPIN TEST: CPT | Performed by: OBSTETRICS & GYNECOLOGY

## 2025-07-16 PROCEDURE — 80053 COMPREHEN METABOLIC PANEL: CPT

## 2025-07-16 PROCEDURE — 83036 HEMOGLOBIN GLYCOSYLATED A1C: CPT

## 2025-07-16 NOTE — PROGRESS NOTES
Venipuncture Blood Specimen Collection  Venipuncture performed in left arm   by Geraldine Mendez with good hemostasis. Patient tolerated the procedure well without complications.   07/16/25   Geraldine Mendez

## 2025-07-18 ENCOUNTER — LAB (OUTPATIENT)
Dept: OBSTETRICS AND GYNECOLOGY | Age: 30
End: 2025-07-18
Payer: COMMERCIAL

## 2025-07-18 DIAGNOSIS — O20.0 THREATENED ABORTION: ICD-10-CM

## 2025-07-18 LAB — HCG INTACT+B SERPL-ACNC: 444 MIU/ML

## 2025-07-18 PROCEDURE — 84702 CHORIONIC GONADOTROPIN TEST: CPT | Performed by: OBSTETRICS & GYNECOLOGY

## 2025-07-21 ENCOUNTER — CLINICAL SUPPORT (OUTPATIENT)
Dept: OBSTETRICS AND GYNECOLOGY | Age: 30
End: 2025-07-21
Payer: COMMERCIAL

## 2025-07-21 DIAGNOSIS — F41.9 ANXIETY: ICD-10-CM

## 2025-07-21 DIAGNOSIS — O20.0 THREATENED ABORTION: ICD-10-CM

## 2025-07-21 LAB — HCG INTACT+B SERPL-ACNC: 1461 MIU/ML

## 2025-07-21 PROCEDURE — 84702 CHORIONIC GONADOTROPIN TEST: CPT | Performed by: OBSTETRICS & GYNECOLOGY

## 2025-07-21 RX ORDER — SERTRALINE HYDROCHLORIDE 100 MG/1
100 TABLET, FILM COATED ORAL DAILY
Qty: 90 TABLET | Refills: 1 | Status: SHIPPED | OUTPATIENT
Start: 2025-07-21

## 2025-07-21 NOTE — PROGRESS NOTES
Venipuncture Blood Specimen Collection  Venipuncture performed in right arm by Fiordaliza Guzman with good hemostasis. Patient tolerated the procedure well without complications.   07/21/25   Fiordaliza Guzman

## 2025-07-22 DIAGNOSIS — Z32.00 ENCOUNTER FOR CONFIRMATION OF PREGNANCY TEST RESULT WITH PHYSICAL EXAMINATION: ICD-10-CM

## 2025-07-25 RX ORDER — PRENATAL WITH FERROUS FUM AND FOLIC ACID 3080; 920; 120; 400; 22; 1.84; 3; 20; 10; 1; 12; 200; 27; 25; 2 [IU]/1; [IU]/1; MG/1; [IU]/1; MG/1; MG/1; MG/1; MG/1; MG/1; MG/1; UG/1; MG/1; MG/1; MG/1; MG/1
1 TABLET ORAL DAILY
Qty: 30 TABLET | Refills: 1 | Status: SHIPPED | OUTPATIENT
Start: 2025-07-25

## 2025-08-06 ENCOUNTER — OFFICE VISIT (OUTPATIENT)
Dept: OBSTETRICS AND GYNECOLOGY | Age: 30
End: 2025-08-06
Payer: COMMERCIAL

## 2025-08-06 VITALS
DIASTOLIC BLOOD PRESSURE: 72 MMHG | SYSTOLIC BLOOD PRESSURE: 107 MMHG | BODY MASS INDEX: 29.77 KG/M2 | HEIGHT: 63 IN | WEIGHT: 168 LBS | HEART RATE: 73 BPM

## 2025-08-06 DIAGNOSIS — O21.9 NAUSEA AND VOMITING DURING PREGNANCY: ICD-10-CM

## 2025-08-06 DIAGNOSIS — E10.9 TYPE 1 DIABETES MELLITUS WITHOUT COMPLICATION: ICD-10-CM

## 2025-08-06 DIAGNOSIS — Z32.00 ENCOUNTER FOR CONFIRMATION OF PREGNANCY TEST RESULT WITH PHYSICAL EXAMINATION: Primary | ICD-10-CM

## 2025-08-06 PROBLEM — O03.9 COMPLETE ABORTION: Status: RESOLVED | Noted: 2025-05-28 | Resolved: 2025-08-06

## 2025-08-06 RX ORDER — PROMETHAZINE HYDROCHLORIDE 12.5 MG/1
12.5 TABLET ORAL EVERY 6 HOURS PRN
Qty: 30 TABLET | Refills: 1 | Status: SHIPPED | OUTPATIENT
Start: 2025-08-06

## 2025-08-08 PROBLEM — O21.9 NAUSEA AND VOMITING DURING PREGNANCY: Status: ACTIVE | Noted: 2025-08-08

## 2025-08-08 PROBLEM — Z32.00 ENCOUNTER FOR CONFIRMATION OF PREGNANCY TEST RESULT WITH PHYSICAL EXAMINATION: Status: ACTIVE | Noted: 2025-08-08

## 2025-08-19 ENCOUNTER — INITIAL PRENATAL (OUTPATIENT)
Dept: OBSTETRICS AND GYNECOLOGY | Age: 30
End: 2025-08-19
Payer: COMMERCIAL

## 2025-08-19 VITALS — BODY MASS INDEX: 29.58 KG/M2 | DIASTOLIC BLOOD PRESSURE: 75 MMHG | SYSTOLIC BLOOD PRESSURE: 110 MMHG | WEIGHT: 167 LBS

## 2025-08-19 DIAGNOSIS — Z98.891 PREVIOUS CESAREAN SECTION: ICD-10-CM

## 2025-08-19 DIAGNOSIS — Z98.891 HISTORY OF VBAC: ICD-10-CM

## 2025-08-19 DIAGNOSIS — O21.9 NAUSEA AND VOMITING IN PREGNANCY: ICD-10-CM

## 2025-08-19 DIAGNOSIS — O24.319 PREGESTATIONAL DIABETES MELLITUS, MODIFIED WHITE CLASS C: ICD-10-CM

## 2025-08-19 DIAGNOSIS — Z34.80 SUPERVISION OF OTHER NORMAL PREGNANCY, ANTEPARTUM: Primary | ICD-10-CM

## 2025-08-19 PROBLEM — Z32.00 ENCOUNTER FOR CONFIRMATION OF PREGNANCY TEST RESULT WITH PHYSICAL EXAMINATION: Status: RESOLVED | Noted: 2025-08-08 | Resolved: 2025-08-19

## 2025-08-19 LAB
ABO GROUP BLD: NORMAL
ALBUMIN SERPL-MCNC: 4.3 G/DL (ref 3.5–5.2)
ALBUMIN/GLOB SERPL: 1.4 G/DL
ALP SERPL-CCNC: 73 U/L (ref 39–117)
ALT SERPL W P-5'-P-CCNC: 14 U/L (ref 1–33)
AMPHET+METHAMPHET UR QL: NEGATIVE
AMPHETAMINES UR QL: NEGATIVE
ANION GAP SERPL CALCULATED.3IONS-SCNC: 13 MMOL/L (ref 5–15)
AST SERPL-CCNC: 37 U/L (ref 1–32)
BARBITURATES UR QL SCN: NEGATIVE
BASOPHILS # BLD AUTO: 0.02 10*3/MM3 (ref 0–0.2)
BASOPHILS NFR BLD AUTO: 0.3 % (ref 0–1.5)
BENZODIAZ UR QL SCN: NEGATIVE
BILIRUB SERPL-MCNC: 0.2 MG/DL (ref 0–1.2)
BLD GP AB SCN SERPL QL: NEGATIVE
BUN SERPL-MCNC: 6 MG/DL (ref 6–20)
BUN/CREAT SERPL: 7.3 (ref 7–25)
BUPRENORPHINE SERPL-MCNC: NEGATIVE NG/ML
CALCIUM SPEC-SCNC: 9.3 MG/DL (ref 8.6–10.5)
CANNABINOIDS SERPL QL: NEGATIVE
CHLORIDE SERPL-SCNC: 105 MMOL/L (ref 98–107)
CO2 SERPL-SCNC: 19 MMOL/L (ref 22–29)
COCAINE UR QL: NEGATIVE
CREAT SERPL-MCNC: 0.82 MG/DL (ref 0.57–1)
DEPRECATED RDW RBC AUTO: 49.5 FL (ref 37–54)
EGFRCR SERPLBLD CKD-EPI 2021: 97.6 ML/MIN/1.73
EOSINOPHIL # BLD AUTO: 0.04 10*3/MM3 (ref 0–0.4)
EOSINOPHIL NFR BLD AUTO: 0.6 % (ref 0.3–6.2)
ERYTHROCYTE [DISTWIDTH] IN BLOOD BY AUTOMATED COUNT: 16.2 % (ref 12.3–15.4)
FENTANYL UR-MCNC: NEGATIVE NG/ML
GLOBULIN UR ELPH-MCNC: 3.1 GM/DL
GLUCOSE SERPL-MCNC: 82 MG/DL (ref 65–99)
GLUCOSE UR STRIP-MCNC: NEGATIVE MG/DL
HBV SURFACE AG SERPL QL IA: NORMAL
HCT VFR BLD AUTO: 39 % (ref 34–46.6)
HCV AB SER QL: NORMAL
HGB BLD-MCNC: 12.4 G/DL (ref 12–15.9)
HIV 1+2 AB+HIV1 P24 AG SERPL QL IA: NORMAL
IMM GRANULOCYTES # BLD AUTO: 0.02 10*3/MM3 (ref 0–0.05)
IMM GRANULOCYTES NFR BLD AUTO: 0.3 % (ref 0–0.5)
LYMPHOCYTES # BLD AUTO: 1.21 10*3/MM3 (ref 0.7–3.1)
LYMPHOCYTES NFR BLD AUTO: 17.5 % (ref 19.6–45.3)
MCH RBC QN AUTO: 26.7 PG (ref 26.6–33)
MCHC RBC AUTO-ENTMCNC: 31.8 G/DL (ref 31.5–35.7)
MCV RBC AUTO: 84.1 FL (ref 79–97)
METHADONE UR QL SCN: NEGATIVE
MONOCYTES # BLD AUTO: 0.38 10*3/MM3 (ref 0.1–0.9)
MONOCYTES NFR BLD AUTO: 5.5 % (ref 5–12)
NEUTROPHILS NFR BLD AUTO: 5.25 10*3/MM3 (ref 1.7–7)
NEUTROPHILS NFR BLD AUTO: 75.8 % (ref 42.7–76)
NRBC BLD AUTO-RTO: 0 /100 WBC (ref 0–0.2)
OPIATES UR QL: NEGATIVE
OXYCODONE UR QL SCN: NEGATIVE
PCP UR QL SCN: NEGATIVE
PLATELET # BLD AUTO: 140 10*3/MM3 (ref 140–450)
PMV BLD AUTO: 12.1 FL (ref 6–12)
POTASSIUM SERPL-SCNC: 3.8 MMOL/L (ref 3.5–5.2)
PROT SERPL-MCNC: 7.4 G/DL (ref 6–8.5)
PROT UR STRIP-MCNC: NEGATIVE MG/DL
RBC # BLD AUTO: 4.64 10*6/MM3 (ref 3.77–5.28)
RH BLD: POSITIVE
SODIUM SERPL-SCNC: 137 MMOL/L (ref 136–145)
TRICYCLICS UR QL SCN: NEGATIVE
WBC NRBC COR # BLD AUTO: 6.92 10*3/MM3 (ref 3.4–10.8)

## 2025-08-19 PROCEDURE — 80081 OBSTETRIC PANEL INC HIV TSTG: CPT | Performed by: OBSTETRICS & GYNECOLOGY

## 2025-08-19 PROCEDURE — 80053 COMPREHEN METABOLIC PANEL: CPT | Performed by: OBSTETRICS & GYNECOLOGY

## 2025-08-19 PROCEDURE — 86803 HEPATITIS C AB TEST: CPT | Performed by: OBSTETRICS & GYNECOLOGY

## 2025-08-19 PROCEDURE — 80307 DRUG TEST PRSMV CHEM ANLYZR: CPT | Performed by: OBSTETRICS & GYNECOLOGY

## 2025-08-19 PROCEDURE — 87591 N.GONORRHOEAE DNA AMP PROB: CPT | Performed by: OBSTETRICS & GYNECOLOGY

## 2025-08-19 PROCEDURE — 87491 CHLMYD TRACH DNA AMP PROBE: CPT | Performed by: OBSTETRICS & GYNECOLOGY

## 2025-08-19 PROCEDURE — 87661 TRICHOMONAS VAGINALIS AMPLIF: CPT | Performed by: OBSTETRICS & GYNECOLOGY

## 2025-08-19 PROCEDURE — 87086 URINE CULTURE/COLONY COUNT: CPT | Performed by: OBSTETRICS & GYNECOLOGY

## 2025-08-19 PROCEDURE — G0123 SCREEN CERV/VAG THIN LAYER: HCPCS | Performed by: OBSTETRICS & GYNECOLOGY

## 2025-08-20 PROBLEM — O21.9 NAUSEA AND VOMITING DURING PREGNANCY: Status: RESOLVED | Noted: 2025-08-08 | Resolved: 2025-08-20

## 2025-08-20 PROBLEM — O24.319 PREGESTATIONAL DIABETES MELLITUS, MODIFIED WHITE CLASS C: Status: ACTIVE | Noted: 2025-08-20

## 2025-08-20 PROBLEM — Z98.891 PREVIOUS CESAREAN SECTION: Status: ACTIVE | Noted: 2025-08-20

## 2025-08-20 PROBLEM — O21.9 NAUSEA AND VOMITING IN PREGNANCY: Status: ACTIVE | Noted: 2025-08-20

## 2025-08-20 PROBLEM — Z98.891 HISTORY OF VBAC: Status: ACTIVE | Noted: 2025-08-20

## 2025-08-20 PROBLEM — Z34.80 SUPERVISION OF OTHER NORMAL PREGNANCY, ANTEPARTUM: Status: ACTIVE | Noted: 2025-08-20

## 2025-08-20 LAB — RPR SER QL: NORMAL

## 2025-08-21 LAB
BACTERIA SPEC AEROBE CULT: NO GROWTH
RUBV IGG SERPL IA-ACNC: 3.55 INDEX

## 2025-08-22 LAB
C TRACH RRNA CVX QL NAA+PROBE: NEGATIVE
CONV .: NORMAL
CYTOLOGIST CVX/VAG CYTO: NORMAL
CYTOLOGY CVX/VAG DOC CYTO: NORMAL
CYTOLOGY CVX/VAG DOC THIN PREP: NORMAL
DX ICD CODE: NORMAL
Lab: NORMAL
N GONORRHOEA RRNA CVX QL NAA+PROBE: NEGATIVE
OTHER STN SPEC: NORMAL
SERVICE CMNT-IMP: NORMAL
STAT OF ADQ CVX/VAG CYTO-IMP: NORMAL
T VAGINALIS RRNA SPEC QL NAA+PROBE: NEGATIVE

## 2025-08-25 ENCOUNTER — LAB (OUTPATIENT)
Dept: OBSTETRICS AND GYNECOLOGY | Age: 30
End: 2025-08-25
Payer: COMMERCIAL

## 2025-08-25 ENCOUNTER — CLINICAL SUPPORT (OUTPATIENT)
Dept: OBSTETRICS AND GYNECOLOGY | Age: 30
End: 2025-08-25
Payer: COMMERCIAL

## 2025-08-25 DIAGNOSIS — Z34.80 SUPERVISION OF OTHER NORMAL PREGNANCY, ANTEPARTUM: ICD-10-CM

## 2025-08-25 PROCEDURE — 36415 COLL VENOUS BLD VENIPUNCTURE: CPT | Performed by: OBSTETRICS & GYNECOLOGY

## 2025-08-27 ENCOUNTER — DOCUMENTATION (OUTPATIENT)
Dept: OBSTETRICS AND GYNECOLOGY | Facility: CLINIC | Age: 30
End: 2025-08-27
Payer: COMMERCIAL

## 2025-08-29 ENCOUNTER — TRANSCRIBE ORDERS (OUTPATIENT)
Dept: ULTRASOUND IMAGING | Facility: HOSPITAL | Age: 30
End: 2025-08-29
Payer: COMMERCIAL

## 2025-08-29 DIAGNOSIS — O24.319 PREGESTATIONAL DIABETES MELLITUS, MODIFIED WHITE CLASS C: Primary | ICD-10-CM
